# Patient Record
Sex: MALE | Race: WHITE | NOT HISPANIC OR LATINO | URBAN - METROPOLITAN AREA
[De-identification: names, ages, dates, MRNs, and addresses within clinical notes are randomized per-mention and may not be internally consistent; named-entity substitution may affect disease eponyms.]

---

## 2023-03-20 DIAGNOSIS — E78.5 HYPERLIPIDEMIA, UNSPECIFIED HYPERLIPIDEMIA TYPE: Primary | ICD-10-CM

## 2023-03-20 RX ORDER — ATORVASTATIN CALCIUM 10 MG/1
10 TABLET, FILM COATED ORAL DAILY
COMMUNITY
Start: 2022-12-20 | End: 2023-03-20 | Stop reason: SDUPTHER

## 2023-03-21 RX ORDER — ATORVASTATIN CALCIUM 10 MG/1
10 TABLET, FILM COATED ORAL DAILY
Qty: 90 TABLET | Refills: 0 | Status: SHIPPED | OUTPATIENT
Start: 2023-03-21 | End: 2023-06-16 | Stop reason: SDUPTHER

## 2023-04-05 ENCOUNTER — OFFICE VISIT (OUTPATIENT)
Dept: PRIMARY CARE | Facility: CLINIC | Age: 68
End: 2023-04-05
Payer: MEDICARE

## 2023-04-05 ENCOUNTER — LAB (OUTPATIENT)
Dept: LAB | Facility: LAB | Age: 68
End: 2023-04-05
Payer: MEDICARE

## 2023-04-05 VITALS
SYSTOLIC BLOOD PRESSURE: 136 MMHG | HEIGHT: 74 IN | BODY MASS INDEX: 36.96 KG/M2 | DIASTOLIC BLOOD PRESSURE: 80 MMHG | WEIGHT: 288 LBS

## 2023-04-05 DIAGNOSIS — E11.9 TYPE 2 DIABETES MELLITUS WITHOUT COMPLICATION, WITHOUT LONG-TERM CURRENT USE OF INSULIN (MULTI): ICD-10-CM

## 2023-04-05 DIAGNOSIS — I49.01 VENTRICULAR FIBRILLATION (MULTI): ICD-10-CM

## 2023-04-05 DIAGNOSIS — Z00.00 HEALTH CARE MAINTENANCE: ICD-10-CM

## 2023-04-05 DIAGNOSIS — I48.11 LONGSTANDING PERSISTENT ATRIAL FIBRILLATION (MULTI): ICD-10-CM

## 2023-04-05 DIAGNOSIS — E03.8 OTHER SPECIFIED HYPOTHYROIDISM: ICD-10-CM

## 2023-04-05 DIAGNOSIS — I82.4Y9 DEEP VEIN THROMBOSIS (DVT) OF PROXIMAL LOWER EXTREMITY, UNSPECIFIED CHRONICITY, UNSPECIFIED LATERALITY (MULTI): ICD-10-CM

## 2023-04-05 DIAGNOSIS — Z00.00 ROUTINE GENERAL MEDICAL EXAMINATION AT HEALTH CARE FACILITY: Primary | ICD-10-CM

## 2023-04-05 DIAGNOSIS — G62.9 NEUROPATHY: ICD-10-CM

## 2023-04-05 DIAGNOSIS — D69.6 THROMBOCYTOPENIA (CMS-HCC): ICD-10-CM

## 2023-04-05 PROBLEM — E78.2 MIXED HYPERLIPIDEMIA: Status: ACTIVE | Noted: 2023-04-05

## 2023-04-05 LAB
ESTIMATED AVERAGE GLUCOSE FOR HBA1C: 126 MG/DL
HEMOGLOBIN A1C/HEMOGLOBIN TOTAL IN BLOOD: 6 %
THYROTROPIN (MIU/L) IN SER/PLAS BY DETECTION LIMIT <= 0.05 MIU/L: 2.58 MIU/L (ref 0.44–3.98)

## 2023-04-05 PROCEDURE — 1160F RVW MEDS BY RX/DR IN RCRD: CPT | Performed by: STUDENT IN AN ORGANIZED HEALTH CARE EDUCATION/TRAINING PROGRAM

## 2023-04-05 PROCEDURE — 1170F FXNL STATUS ASSESSED: CPT | Performed by: STUDENT IN AN ORGANIZED HEALTH CARE EDUCATION/TRAINING PROGRAM

## 2023-04-05 PROCEDURE — 83036 HEMOGLOBIN GLYCOSYLATED A1C: CPT

## 2023-04-05 PROCEDURE — 1036F TOBACCO NON-USER: CPT | Performed by: STUDENT IN AN ORGANIZED HEALTH CARE EDUCATION/TRAINING PROGRAM

## 2023-04-05 PROCEDURE — G0439 PPPS, SUBSEQ VISIT: HCPCS | Performed by: STUDENT IN AN ORGANIZED HEALTH CARE EDUCATION/TRAINING PROGRAM

## 2023-04-05 PROCEDURE — 36415 COLL VENOUS BLD VENIPUNCTURE: CPT

## 2023-04-05 PROCEDURE — 1159F MED LIST DOCD IN RCRD: CPT | Performed by: STUDENT IN AN ORGANIZED HEALTH CARE EDUCATION/TRAINING PROGRAM

## 2023-04-05 PROCEDURE — 3075F SYST BP GE 130 - 139MM HG: CPT | Performed by: STUDENT IN AN ORGANIZED HEALTH CARE EDUCATION/TRAINING PROGRAM

## 2023-04-05 PROCEDURE — 99213 OFFICE O/P EST LOW 20 MIN: CPT | Performed by: STUDENT IN AN ORGANIZED HEALTH CARE EDUCATION/TRAINING PROGRAM

## 2023-04-05 PROCEDURE — 84443 ASSAY THYROID STIM HORMONE: CPT

## 2023-04-05 PROCEDURE — 3079F DIAST BP 80-89 MM HG: CPT | Performed by: STUDENT IN AN ORGANIZED HEALTH CARE EDUCATION/TRAINING PROGRAM

## 2023-04-05 RX ORDER — BUDESONIDE AND FORMOTEROL FUMARATE DIHYDRATE 80; 4.5 UG/1; UG/1
2 AEROSOL RESPIRATORY (INHALATION) 2 TIMES DAILY
COMMUNITY
End: 2024-04-16 | Stop reason: SDUPTHER

## 2023-04-05 RX ORDER — TAMSULOSIN HYDROCHLORIDE 0.4 MG/1
0.4 CAPSULE ORAL NIGHTLY
COMMUNITY
End: 2023-09-21 | Stop reason: SDUPTHER

## 2023-04-05 RX ORDER — GABAPENTIN 300 MG/1
300 CAPSULE ORAL 3 TIMES DAILY
Qty: 90 CAPSULE | Refills: 3 | Status: SHIPPED | OUTPATIENT
Start: 2023-04-05 | End: 2023-06-19 | Stop reason: SDUPTHER

## 2023-04-05 RX ORDER — SEMAGLUTIDE 0.68 MG/ML
INJECTION, SOLUTION SUBCUTANEOUS
COMMUNITY
Start: 2023-03-27 | End: 2023-04-05 | Stop reason: ALTCHOICE

## 2023-04-05 RX ORDER — ASCORBIC ACID 1000 MG
120 TABLET ORAL DAILY
COMMUNITY

## 2023-04-05 RX ORDER — ALBUTEROL SULFATE 90 UG/1
1 AEROSOL, METERED RESPIRATORY (INHALATION) EVERY 6 HOURS PRN
COMMUNITY
Start: 2008-12-25

## 2023-04-05 RX ORDER — CETIRIZINE HYDROCHLORIDE 10 MG/1
10 TABLET ORAL DAILY
COMMUNITY
End: 2023-11-15 | Stop reason: ALTCHOICE

## 2023-04-05 RX ORDER — BLOOD-GLUCOSE METER
KIT MISCELLANEOUS
COMMUNITY
Start: 2022-06-10 | End: 2024-01-31 | Stop reason: ALTCHOICE

## 2023-04-05 RX ORDER — RIVAROXABAN 20 MG/1
20 TABLET, FILM COATED ORAL
COMMUNITY
Start: 2021-12-28 | End: 2024-03-07 | Stop reason: SDUPTHER

## 2023-04-05 RX ORDER — LEVOTHYROXINE SODIUM 50 UG/1
50 TABLET ORAL DAILY
COMMUNITY
End: 2023-07-17 | Stop reason: SDUPTHER

## 2023-04-05 RX ORDER — BETAMETHASONE DIPROPIONATE 0.5 MG/G
CREAM TOPICAL
COMMUNITY
Start: 2023-01-05 | End: 2023-07-07 | Stop reason: ALTCHOICE

## 2023-04-05 RX ORDER — GABAPENTIN 300 MG/1
300 CAPSULE ORAL 3 TIMES DAILY
COMMUNITY
End: 2023-04-05 | Stop reason: SDUPTHER

## 2023-04-05 RX ORDER — METOPROLOL SUCCINATE 100 MG/1
100 TABLET, EXTENDED RELEASE ORAL 2 TIMES DAILY
COMMUNITY

## 2023-04-05 ASSESSMENT — ENCOUNTER SYMPTOMS
DEPRESSION: 0
LOSS OF SENSATION IN FEET: 0
OCCASIONAL FEELINGS OF UNSTEADINESS: 0

## 2023-04-05 ASSESSMENT — ACTIVITIES OF DAILY LIVING (ADL)
MANAGING_FINANCES: INDEPENDENT
BATHING: INDEPENDENT
DOING_HOUSEWORK: INDEPENDENT
GROCERY_SHOPPING: INDEPENDENT
TAKING_MEDICATION: INDEPENDENT
DRESSING: INDEPENDENT

## 2023-04-05 NOTE — PROGRESS NOTES
"Subjective   Patient ID: Aleks Boucher is a 67 y.o. male who presents for Medicare Annual Wellness Visit Subsequent (Discuss med).    HPI   Routine fu and Wellness exam  He says Ozempic is expensive, wants to try decreasing dose of Ozempic.  Blood sugars are well-controlled at home.  Walking regularly for exercise.  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  Review of Systems  12-point ROS reviewed and was negative unless otherwise noted in HPI.    Objective   /80   Ht 1.88 m (6' 2\")   Wt 131 kg (288 lb)   BMI 36.98 kg/m²     Physical Exam  GEN: conversant, NAD  HEENT: PERRL, EOMI, wearing a mask  NECK: supple, no carotid bruits appreciated b/l  CV: S1, S2, RRR  PULM: CTAB  ABD: soft, NT, obese  NEURO: no new gross focal deficits  EXT: +BLE edema  PSYCH: appropriate affect    Assessment/Plan     #H/o VT arrest s/p ICD  Follows with EP and getting ICD replacement soon.     #Permanent A. fib  Well-controlled  Following with cardiology    #Hyperlipidemia  Continue atorvastatin 10mg QD    #Diabetes, type 2  Try decreasing Ozempic dose to 0.25mg at his request, discussed healthy lifestyle habits.   A1c at appropriate time   Home sugars in the 100s.     #Hypothyroidism  Continue levothyroxine, check TSH    #BPH - continue tamsulosin  #Nosebleed, resolved  Using nasal saline regularly.       #Health maintenance: he says that he had a colonoscopy in 2022, no polyps. Advised Shingrix and yearly flu shot. Received COVID-19 vaccines. Received Pneumovax 23. He declines TDaP.    RTC 4 months      "

## 2023-04-06 ENCOUNTER — HOSPITAL ENCOUNTER (OUTPATIENT)
Dept: DATA CONVERSION | Facility: HOSPITAL | Age: 68
End: 2023-04-06
Attending: INTERNAL MEDICINE | Admitting: INTERNAL MEDICINE
Payer: MEDICARE

## 2023-04-06 DIAGNOSIS — I48.0 PAROXYSMAL ATRIAL FIBRILLATION (MULTI): ICD-10-CM

## 2023-04-06 DIAGNOSIS — I49.01 VENTRICULAR FIBRILLATION (MULTI): ICD-10-CM

## 2023-04-06 DIAGNOSIS — I34.0 NONRHEUMATIC MITRAL (VALVE) INSUFFICIENCY: ICD-10-CM

## 2023-04-06 DIAGNOSIS — Z79.01 LONG TERM (CURRENT) USE OF ANTICOAGULANTS: ICD-10-CM

## 2023-04-06 DIAGNOSIS — Z88.0 ALLERGY STATUS TO PENICILLIN: ICD-10-CM

## 2023-04-06 DIAGNOSIS — I47.20 VENTRICULAR TACHYCARDIA, UNSPECIFIED (MULTI): ICD-10-CM

## 2023-04-06 DIAGNOSIS — Z45.02 ENCOUNTER FOR ADJUSTMENT AND MANAGEMENT OF AUTOMATIC IMPLANTABLE CARDIAC DEFIBRILLATOR: ICD-10-CM

## 2023-04-06 DIAGNOSIS — Z79.85 LONG-TERM (CURRENT) USE OF INJECTABLE NON-INSULIN ANTIDIABETIC DRUGS: ICD-10-CM

## 2023-04-06 DIAGNOSIS — E11.9 TYPE 2 DIABETES MELLITUS WITHOUT COMPLICATIONS (MULTI): ICD-10-CM

## 2023-04-06 DIAGNOSIS — Z86.718 PERSONAL HISTORY OF OTHER VENOUS THROMBOSIS AND EMBOLISM: ICD-10-CM

## 2023-04-06 LAB — POCT GLUCOSE: 99 MG/DL (ref 74–99)

## 2023-04-07 LAB
ATRIAL RATE: 87 BPM
Q ONSET: 222 MS
QRS COUNT: 12 BEATS
QRS DURATION: 94 MS
QT INTERVAL: 426 MS
QTC CALCULATION(BAZETT): 472 MS
QTC FREDERICIA: 457 MS
R AXIS: 24 DEGREES
T AXIS: 17 DEGREES
T OFFSET: 435 MS
VENTRICULAR RATE: 74 BPM

## 2023-05-25 ENCOUNTER — OFFICE VISIT (OUTPATIENT)
Dept: PRIMARY CARE | Facility: CLINIC | Age: 68
End: 2023-05-25
Payer: MEDICARE

## 2023-05-25 VITALS
HEIGHT: 74 IN | WEIGHT: 295 LBS | DIASTOLIC BLOOD PRESSURE: 82 MMHG | BODY MASS INDEX: 37.86 KG/M2 | SYSTOLIC BLOOD PRESSURE: 124 MMHG

## 2023-05-25 DIAGNOSIS — E11.9 TYPE 2 DIABETES MELLITUS WITHOUT COMPLICATION, WITHOUT LONG-TERM CURRENT USE OF INSULIN (MULTI): Primary | ICD-10-CM

## 2023-05-25 DIAGNOSIS — E78.2 MIXED HYPERLIPIDEMIA: ICD-10-CM

## 2023-05-25 DIAGNOSIS — I48.11 LONGSTANDING PERSISTENT ATRIAL FIBRILLATION (MULTI): ICD-10-CM

## 2023-05-25 DIAGNOSIS — E03.9 HYPOTHYROIDISM, UNSPECIFIED TYPE: ICD-10-CM

## 2023-05-25 PROCEDURE — 99214 OFFICE O/P EST MOD 30 MIN: CPT | Performed by: STUDENT IN AN ORGANIZED HEALTH CARE EDUCATION/TRAINING PROGRAM

## 2023-05-25 PROCEDURE — 1160F RVW MEDS BY RX/DR IN RCRD: CPT | Performed by: STUDENT IN AN ORGANIZED HEALTH CARE EDUCATION/TRAINING PROGRAM

## 2023-05-25 PROCEDURE — 3079F DIAST BP 80-89 MM HG: CPT | Performed by: STUDENT IN AN ORGANIZED HEALTH CARE EDUCATION/TRAINING PROGRAM

## 2023-05-25 PROCEDURE — 1036F TOBACCO NON-USER: CPT | Performed by: STUDENT IN AN ORGANIZED HEALTH CARE EDUCATION/TRAINING PROGRAM

## 2023-05-25 PROCEDURE — 3074F SYST BP LT 130 MM HG: CPT | Performed by: STUDENT IN AN ORGANIZED HEALTH CARE EDUCATION/TRAINING PROGRAM

## 2023-05-25 PROCEDURE — 3044F HG A1C LEVEL LT 7.0%: CPT | Performed by: STUDENT IN AN ORGANIZED HEALTH CARE EDUCATION/TRAINING PROGRAM

## 2023-05-25 PROCEDURE — 1159F MED LIST DOCD IN RCRD: CPT | Performed by: STUDENT IN AN ORGANIZED HEALTH CARE EDUCATION/TRAINING PROGRAM

## 2023-05-25 NOTE — PROGRESS NOTES
"Subjective   Patient ID: Aleks Boucher is a 68 y.o. male who presents for Follow-up (Discuss meds).    HPI   Routine fu.   His Ozempic dose was decreased after our last visit at his request, but blood sugars have been a bit higher, so he is asking to increase Ozempic again back to 0.5mg.    He is asking for patient assistance application to be filled out for [employer] to have Ozempic be covered.  Review of Systems  12-point ROS reviewed and was negative unless otherwise noted in HPI.    Objective   /82   Ht 1.88 m (6' 2\")   Wt 134 kg (295 lb)   BMI 37.88 kg/m²     Physical Exam  GEN: conversant, NAD  HEENT: PERRL, EOMI, MMM  NECK: supple, no carotid bruits appreciated b/l  CV: S1, S2, RRR  PULM: CTAB  ABD: soft, NT, obese  NEURO: no new gross focal deficits  EXT: no sig LE edema  PSYCH: appropriate affect    Assessment/Plan     #H/o VT arrest s/p ICD  Follows with EP and getting ICD replacement soon.      #Permanent A. fib  Well-controlled  Following with cardiology     #Hyperlipidemia  Continue atorvastatin 10mg QD     #Diabetes, type 2  Increase Ozempic dose to 0.5mg, discussed healthy lifestyle habits.   A1c at appropriate time   Completed paperwork for pharmaceutical company coverage     #Hypothyroidism  Continue levothyroxine     #BPH - continue tamsulosin  #Nosebleed, resolved  Using nasal saline regularly.      #Health maintenance: he says that he had a colonoscopy in 2022, no polyps. Advised Shingrix and yearly flu shot. Received COVID-19 vaccines. Received Pneumovax 23. He declines TDaP.     RTC 4 months      "

## 2023-06-16 DIAGNOSIS — E78.5 HYPERLIPIDEMIA, UNSPECIFIED HYPERLIPIDEMIA TYPE: ICD-10-CM

## 2023-06-16 RX ORDER — ATORVASTATIN CALCIUM 10 MG/1
10 TABLET, FILM COATED ORAL DAILY
Qty: 90 TABLET | Refills: 0 | Status: SHIPPED | OUTPATIENT
Start: 2023-06-16 | End: 2023-09-21 | Stop reason: SDUPTHER

## 2023-06-19 DIAGNOSIS — G62.9 NEUROPATHY: ICD-10-CM

## 2023-06-19 RX ORDER — GABAPENTIN 300 MG/1
300 CAPSULE ORAL 3 TIMES DAILY
Qty: 270 CAPSULE | Refills: 0 | Status: SHIPPED | OUTPATIENT
Start: 2023-06-19 | End: 2023-09-21 | Stop reason: SDUPTHER

## 2023-07-07 ENCOUNTER — OFFICE VISIT (OUTPATIENT)
Dept: PRIMARY CARE | Facility: CLINIC | Age: 68
End: 2023-07-07
Payer: MEDICARE

## 2023-07-07 VITALS
HEIGHT: 74 IN | BODY MASS INDEX: 37.99 KG/M2 | SYSTOLIC BLOOD PRESSURE: 126 MMHG | WEIGHT: 296 LBS | DIASTOLIC BLOOD PRESSURE: 78 MMHG

## 2023-07-07 DIAGNOSIS — J32.9 BACTERIAL SINUSITIS: Primary | ICD-10-CM

## 2023-07-07 DIAGNOSIS — H10.9 BACTERIAL CONJUNCTIVITIS: ICD-10-CM

## 2023-07-07 DIAGNOSIS — B96.89 BACTERIAL SINUSITIS: Primary | ICD-10-CM

## 2023-07-07 PROCEDURE — 3078F DIAST BP <80 MM HG: CPT | Performed by: INTERNAL MEDICINE

## 2023-07-07 PROCEDURE — 99214 OFFICE O/P EST MOD 30 MIN: CPT | Performed by: INTERNAL MEDICINE

## 2023-07-07 PROCEDURE — 3044F HG A1C LEVEL LT 7.0%: CPT | Performed by: INTERNAL MEDICINE

## 2023-07-07 PROCEDURE — 1159F MED LIST DOCD IN RCRD: CPT | Performed by: INTERNAL MEDICINE

## 2023-07-07 PROCEDURE — 3074F SYST BP LT 130 MM HG: CPT | Performed by: INTERNAL MEDICINE

## 2023-07-07 PROCEDURE — 1036F TOBACCO NON-USER: CPT | Performed by: INTERNAL MEDICINE

## 2023-07-07 PROCEDURE — 1160F RVW MEDS BY RX/DR IN RCRD: CPT | Performed by: INTERNAL MEDICINE

## 2023-07-07 PROCEDURE — 1126F AMNT PAIN NOTED NONE PRSNT: CPT | Performed by: INTERNAL MEDICINE

## 2023-07-07 RX ORDER — AZITHROMYCIN 250 MG/1
TABLET, FILM COATED ORAL
Qty: 6 TABLET | Refills: 3 | Status: SHIPPED | OUTPATIENT
Start: 2023-07-07 | End: 2023-07-12

## 2023-07-07 RX ORDER — CIPROFLOXACIN HYDROCHLORIDE 3 MG/ML
1 SOLUTION/ DROPS OPHTHALMIC
Qty: 6 ML | Refills: 3 | Status: SHIPPED | OUTPATIENT
Start: 2023-07-07 | End: 2023-07-17

## 2023-07-07 NOTE — PROGRESS NOTES
"Subjective   Patient ID: Aleks Boucher is a 68 y.o. male who presents for Sinus Problem (Congestion,x 1 week).  HPI  Patient presents with sinus congestion 1 week grade 6 nothing makes better or worse  Green rhinorrhea  Postnasal drip  Occasional cough AM prod  Feels like his eyes are affected some crusting where they feel shot in the morning  Denies chest pain dyspnea nausea vomiting fever chills otalgia otorrhea dyne aphasia              Health Maintenance:      Colonoscopy:      Mammogram:      Pelvic/Pap:      Low dose chest CT:      Aorta duplex:      Optho:      Podiatry:        Vaccines:      Prevnar 20:      Prevnar 13:      Pneumovax 23:      Tdap:      Shingrix:      COVID:      Influenza:        ROS:      General: denies fever/chills/weight loss      Head: Sinus congestion denies HA/trauma/masses/dizziness      Eyes: denies vision change/loss of vision/blurry vision/diplopia/eye pain      Ears: denies hearing loss/tinnitus/otalgia/otorrhea      Nose: Rhinorrhea denies nasal drainage/anosmia      Throat: denies dysphagia/odynophagia      Lymphatics: denies lymph node swelling      Cardiac: denies CP/palpitations/orthopnea/PND      Pulmonary: Cough denies dyspnea/cough/wheezing      GI: denies abd pain/n/v/diarrhea/melena/hematochezia/hematemesis      : denies dysuria/hematuria/change frequency      Genital: denies genital discharge/lesions      Skin: denies rashes/lesions/masses      MSK: denies weakness/swelling/edema/gait imbalance/pain      Neuro: denies paresthesias/seizures/dysarthria      Psych: denies depression/anxiety/suicidal or homicidal ideations            Objective   /78   Ht 1.88 m (6' 2\")   Wt 134 kg (296 lb)   BMI 38.00 kg/m²      Physical Exam:     General: AO3, NAD     Head: atraumatic/NC     Eyes: EOMI/PERRLA. Negative APD     Ears: TM pearly gray, EAC clear. No lesions or erythema     Nose: symmetric nares, no discharge     Throat: Moderate posterior oropharyngeal erythema " "trachea midline, uvula midline pink mucosa. No thyromegaly     Lymphatics: no cervical/supraclavicular/ant or posterior cervical adenopathy/axillary/inguinal adenopathy     Breast: not examined     Chest: no deformity or tenderness to palpation     Pulm: CTA b/l, no wheeze/rhonchi/rales. nonlabored     Cardiac: RRR +s1s2, no m/r/g.      GI: soft, NT/ND. Normoactive Bsx4. No rebound/guarding.     Rectal: no examined     MSK: 5/5 strength UE LE. No edema/clubbing/cyanosis     Skin: no rashes/lesions     Vascular: 2+ palp DP PT radials b/l. Negative carotid bruit     Neuro: CNII-XII intact. No focal deficits. Reflexes 2/4 brachioradialis bicep tricep patellar achilles. Finger to nose intact.     Psych: appropriate mood/affect                    No results found for: \"BMPR1A\", \"CBCDIF\"      Assessment/Plan   Diagnoses and all orders for this visit:  Bacterial sinusitis  Comments:  Less likely superimposed COVID versus other  Orders:  -     azithromycin (Zithromax) 250 mg tablet; Take 2 tablets (500 mg) by mouth once daily for 1 day, THEN 1 tablet (250 mg) once daily for 4 days. Take 2 tabs (500 mg) by mouth today, than 1 daily for 4 days..  Bacterial conjunctivitis  -     ciprofloxacin (Ciloxan) 0.3 % ophthalmic solution; Administer 1 drop into both eyes every 2 hours for 10 days.    Take a home COVID test as you mentioned    Screening blood work due December 2023    Thank you for making appointment today Aleks    Please follow-up as planned with Dr. Sarkar on August 2, 2023       Aditya Hand,   "

## 2023-07-17 DIAGNOSIS — E03.9 HYPOTHYROIDISM, UNSPECIFIED TYPE: Primary | ICD-10-CM

## 2023-07-17 RX ORDER — LEVOTHYROXINE SODIUM 50 UG/1
50 TABLET ORAL DAILY
Qty: 90 TABLET | Refills: 0 | Status: SHIPPED | OUTPATIENT
Start: 2023-07-17 | End: 2023-09-21 | Stop reason: SDUPTHER

## 2023-08-02 ENCOUNTER — APPOINTMENT (OUTPATIENT)
Dept: PRIMARY CARE | Facility: CLINIC | Age: 68
End: 2023-08-02
Payer: MEDICARE

## 2023-09-14 NOTE — H&P
History & Physical Reviewed:   I have reviewed the History and Physical dated:  05-Apr-2023   History and Physical reviewed and relevant findings noted. Patient examined to review pertinent physical  findings.: No significant changes   Home Medications Reviewed: no changes noted   Allergies Reviewed: no changes noted       Airway/Sedation Assessment:  ·  Emotional Status calm   ·  Neurologic alert & oriented x 3   ·  Respiratory clear to auscultation   ·  Cardiovascular Afib, HR 69.   ·  GI/ soft, nontender     · Pulses present: Pedal Left, Pedal Right, Radial Left, Radial Right     ·  Mouth Opening OK yes   ·  Neck Flexibility OK yes   ·  Loose Teeth no     Oropharyngeal Classification:          ·  Oropharyngeal Classification Class III   ·  ASA PS Classification ASA III   ·  Sedation Plan moderate sedation       ERAS (Enhanced Recovery After Surgery):  ·  ERAS Patient: no     Consent:   COVID-19 Consent:  ·  COVID-19 Risk Consent Surgeon has reviewed key risks related to the risk of loretta COVID-19 and if they contract COVID-19 what the risks are.       Electronic Signatures:  Flower Herron (APRN-CNP)   (Signed 06-Apr-2023 10:54)   Entered: History & Physical Reviewed, Note Completion, Airway/Sedation, ERAS, Consent   Authored: History & Physical Reviewed, Airway/Sedation, ERAS, Consent, Note Completion  Ramicone, James ()   (Signed 06-Apr-2023 12:19)   Co-Signer: History & Physical Reviewed, Note Completion, Airway/Sedation, ERAS, Consent    Last Updated: 06-Apr-2023 12:19 by Ramicone, James ()

## 2023-09-21 ENCOUNTER — LAB (OUTPATIENT)
Dept: LAB | Facility: LAB | Age: 68
End: 2023-09-21
Payer: MEDICARE

## 2023-09-21 ENCOUNTER — OFFICE VISIT (OUTPATIENT)
Dept: PRIMARY CARE | Facility: CLINIC | Age: 68
End: 2023-09-21
Payer: MEDICARE

## 2023-09-21 VITALS
BODY MASS INDEX: 35.94 KG/M2 | HEIGHT: 74 IN | DIASTOLIC BLOOD PRESSURE: 84 MMHG | WEIGHT: 280 LBS | SYSTOLIC BLOOD PRESSURE: 128 MMHG

## 2023-09-21 DIAGNOSIS — Z00.00 HEALTH CARE MAINTENANCE: ICD-10-CM

## 2023-09-21 DIAGNOSIS — E78.5 HYPERLIPIDEMIA, UNSPECIFIED HYPERLIPIDEMIA TYPE: ICD-10-CM

## 2023-09-21 DIAGNOSIS — E11.69 TYPE 2 DIABETES MELLITUS WITH OTHER SPECIFIED COMPLICATION, WITHOUT LONG-TERM CURRENT USE OF INSULIN (MULTI): ICD-10-CM

## 2023-09-21 DIAGNOSIS — E11.9 TYPE 2 DIABETES MELLITUS WITHOUT COMPLICATION, WITHOUT LONG-TERM CURRENT USE OF INSULIN (MULTI): Primary | ICD-10-CM

## 2023-09-21 DIAGNOSIS — I49.01 VENTRICULAR FIBRILLATION (MULTI): ICD-10-CM

## 2023-09-21 DIAGNOSIS — G62.9 NEUROPATHY: ICD-10-CM

## 2023-09-21 DIAGNOSIS — N40.0 BENIGN PROSTATIC HYPERPLASIA, UNSPECIFIED WHETHER LOWER URINARY TRACT SYMPTOMS PRESENT: ICD-10-CM

## 2023-09-21 DIAGNOSIS — G89.29 CHRONIC PAIN OF BOTH SHOULDERS: ICD-10-CM

## 2023-09-21 DIAGNOSIS — E03.9 HYPOTHYROIDISM, UNSPECIFIED TYPE: ICD-10-CM

## 2023-09-21 DIAGNOSIS — Z00.00 HEALTHCARE MAINTENANCE: ICD-10-CM

## 2023-09-21 DIAGNOSIS — M25.512 CHRONIC PAIN OF BOTH SHOULDERS: ICD-10-CM

## 2023-09-21 DIAGNOSIS — Z12.5 SCREENING FOR PROSTATE CANCER: ICD-10-CM

## 2023-09-21 DIAGNOSIS — M25.511 CHRONIC PAIN OF BOTH SHOULDERS: ICD-10-CM

## 2023-09-21 LAB
ALANINE AMINOTRANSFERASE (SGPT) (U/L) IN SER/PLAS: 42 U/L (ref 10–52)
ALBUMIN (G/DL) IN SER/PLAS: 4.3 G/DL (ref 3.4–5)
ALKALINE PHOSPHATASE (U/L) IN SER/PLAS: 58 U/L (ref 33–136)
ANION GAP IN SER/PLAS: 14 MMOL/L (ref 10–20)
ASPARTATE AMINOTRANSFERASE (SGOT) (U/L) IN SER/PLAS: 29 U/L (ref 9–39)
BILIRUBIN TOTAL (MG/DL) IN SER/PLAS: 0.8 MG/DL (ref 0–1.2)
CALCIUM (MG/DL) IN SER/PLAS: 10 MG/DL (ref 8.6–10.6)
CARBON DIOXIDE, TOTAL (MMOL/L) IN SER/PLAS: 24 MMOL/L (ref 21–32)
CHLORIDE (MMOL/L) IN SER/PLAS: 105 MMOL/L (ref 98–107)
CHOLESTEROL (MG/DL) IN SER/PLAS: 148 MG/DL (ref 0–199)
CHOLESTEROL IN HDL (MG/DL) IN SER/PLAS: 46.7 MG/DL
CHOLESTEROL/HDL RATIO: 3.2
CREATININE (MG/DL) IN SER/PLAS: 1.09 MG/DL (ref 0.5–1.3)
ERYTHROCYTE DISTRIBUTION WIDTH (RATIO) BY AUTOMATED COUNT: 13.7 % (ref 11.5–14.5)
ERYTHROCYTE MEAN CORPUSCULAR HEMOGLOBIN CONCENTRATION (G/DL) BY AUTOMATED: 34.4 G/DL (ref 32–36)
ERYTHROCYTE MEAN CORPUSCULAR VOLUME (FL) BY AUTOMATED COUNT: 97 FL (ref 80–100)
ERYTHROCYTES (10*6/UL) IN BLOOD BY AUTOMATED COUNT: 4.64 X10E12/L (ref 4.5–5.9)
ESTIMATED AVERAGE GLUCOSE FOR HBA1C: 123 MG/DL
GFR MALE: 74 ML/MIN/1.73M2
GLUCOSE (MG/DL) IN SER/PLAS: 109 MG/DL (ref 74–99)
HEMATOCRIT (%) IN BLOOD BY AUTOMATED COUNT: 44.8 % (ref 41–52)
HEMOGLOBIN (G/DL) IN BLOOD: 15.4 G/DL (ref 13.5–17.5)
HEMOGLOBIN A1C/HEMOGLOBIN TOTAL IN BLOOD: 5.9 %
LDL: 57 MG/DL (ref 0–99)
LEUKOCYTES (10*3/UL) IN BLOOD BY AUTOMATED COUNT: 6.8 X10E9/L (ref 4.4–11.3)
NON HDL CHOLESTEROL: 101 MG/DL
NRBC (PER 100 WBCS) BY AUTOMATED COUNT: 0 /100 WBC (ref 0–0)
PLATELETS (10*3/UL) IN BLOOD AUTOMATED COUNT: 152 X10E9/L (ref 150–450)
POTASSIUM (MMOL/L) IN SER/PLAS: 4.6 MMOL/L (ref 3.5–5.3)
PROSTATE SPECIFIC AG (NG/ML) IN SER/PLAS: 0.26 NG/ML (ref 0–4)
PROTEIN TOTAL: 6.9 G/DL (ref 6.4–8.2)
SODIUM (MMOL/L) IN SER/PLAS: 138 MMOL/L (ref 136–145)
THYROTROPIN (MIU/L) IN SER/PLAS BY DETECTION LIMIT <= 0.05 MIU/L: 2.47 MIU/L (ref 0.44–3.98)
TRIGLYCERIDE (MG/DL) IN SER/PLAS: 222 MG/DL (ref 0–149)
UREA NITROGEN (MG/DL) IN SER/PLAS: 17 MG/DL (ref 6–23)
VLDL: 44 MG/DL (ref 0–40)

## 2023-09-21 PROCEDURE — 90686 IIV4 VACC NO PRSV 0.5 ML IM: CPT | Performed by: STUDENT IN AN ORGANIZED HEALTH CARE EDUCATION/TRAINING PROGRAM

## 2023-09-21 PROCEDURE — 1160F RVW MEDS BY RX/DR IN RCRD: CPT | Performed by: STUDENT IN AN ORGANIZED HEALTH CARE EDUCATION/TRAINING PROGRAM

## 2023-09-21 PROCEDURE — 80061 LIPID PANEL: CPT

## 2023-09-21 PROCEDURE — 83036 HEMOGLOBIN GLYCOSYLATED A1C: CPT

## 2023-09-21 PROCEDURE — G0008 ADMIN INFLUENZA VIRUS VAC: HCPCS | Performed by: STUDENT IN AN ORGANIZED HEALTH CARE EDUCATION/TRAINING PROGRAM

## 2023-09-21 PROCEDURE — 3079F DIAST BP 80-89 MM HG: CPT | Performed by: STUDENT IN AN ORGANIZED HEALTH CARE EDUCATION/TRAINING PROGRAM

## 2023-09-21 PROCEDURE — 99214 OFFICE O/P EST MOD 30 MIN: CPT | Performed by: STUDENT IN AN ORGANIZED HEALTH CARE EDUCATION/TRAINING PROGRAM

## 2023-09-21 PROCEDURE — 1036F TOBACCO NON-USER: CPT | Performed by: STUDENT IN AN ORGANIZED HEALTH CARE EDUCATION/TRAINING PROGRAM

## 2023-09-21 PROCEDURE — 36415 COLL VENOUS BLD VENIPUNCTURE: CPT

## 2023-09-21 PROCEDURE — 85027 COMPLETE CBC AUTOMATED: CPT

## 2023-09-21 PROCEDURE — 1159F MED LIST DOCD IN RCRD: CPT | Performed by: STUDENT IN AN ORGANIZED HEALTH CARE EDUCATION/TRAINING PROGRAM

## 2023-09-21 PROCEDURE — 80053 COMPREHEN METABOLIC PANEL: CPT

## 2023-09-21 PROCEDURE — 3044F HG A1C LEVEL LT 7.0%: CPT | Performed by: STUDENT IN AN ORGANIZED HEALTH CARE EDUCATION/TRAINING PROGRAM

## 2023-09-21 PROCEDURE — 84443 ASSAY THYROID STIM HORMONE: CPT

## 2023-09-21 PROCEDURE — 82570 ASSAY OF URINE CREATININE: CPT

## 2023-09-21 PROCEDURE — G0103 PSA SCREENING: HCPCS

## 2023-09-21 PROCEDURE — 82043 UR ALBUMIN QUANTITATIVE: CPT

## 2023-09-21 PROCEDURE — 3074F SYST BP LT 130 MM HG: CPT | Performed by: STUDENT IN AN ORGANIZED HEALTH CARE EDUCATION/TRAINING PROGRAM

## 2023-09-21 PROCEDURE — 1126F AMNT PAIN NOTED NONE PRSNT: CPT | Performed by: STUDENT IN AN ORGANIZED HEALTH CARE EDUCATION/TRAINING PROGRAM

## 2023-09-21 RX ORDER — TAMSULOSIN HYDROCHLORIDE 0.4 MG/1
0.4 CAPSULE ORAL NIGHTLY
Qty: 90 CAPSULE | Refills: 1 | Status: SHIPPED | OUTPATIENT
Start: 2023-09-21 | End: 2024-05-02 | Stop reason: SDUPTHER

## 2023-09-21 RX ORDER — GABAPENTIN 300 MG/1
300 CAPSULE ORAL 3 TIMES DAILY
Qty: 270 CAPSULE | Refills: 1 | Status: SHIPPED | OUTPATIENT
Start: 2023-09-21 | End: 2024-03-21 | Stop reason: SDUPTHER

## 2023-09-21 RX ORDER — ATORVASTATIN CALCIUM 10 MG/1
10 TABLET, FILM COATED ORAL DAILY
Qty: 90 TABLET | Refills: 1 | Status: SHIPPED | OUTPATIENT
Start: 2023-09-21 | End: 2024-03-21 | Stop reason: SDUPTHER

## 2023-09-21 RX ORDER — LEVOTHYROXINE SODIUM 50 UG/1
50 TABLET ORAL DAILY
Qty: 90 TABLET | Refills: 1 | Status: SHIPPED | OUTPATIENT
Start: 2023-09-21 | End: 2024-03-21 | Stop reason: SDUPTHER

## 2023-09-21 NOTE — PROGRESS NOTES
"Subjective   Patient ID: Aleks Boucher is a 68 y.o. male who presents for Follow-up (Med refill).    HPI   Routine fu.  Med refill.  He generally feels well aside from chronic shoulder pain.  Review of Systems  12-point ROS reviewed and was negative unless otherwise noted in HPI.    Objective   Ht 1.88 m (6' 2\")   Wt 127 kg (280 lb)   BMI 35.95 kg/m²     Physical Exam  GEN: conversant, NAD  HEENT: PERRL, EOMI, MMM  NECK: supple, no carotid bruits appreciated b/l  CV: S1, S2, RRR  PULM: CTAB  ABD: soft, NT, obese  NEURO: no new gross focal deficits  EXT: +BLE edema  PSYCH: appropriate affect    Assessment/Plan     #H/o VT arrest s/p ICD  Follows with EP and getting ICD replacement soon.      #Permanent A. fib  Well-controlled  Following with cardiology     #Hyperlipidemia  Continue atorvastatin 10mg daily, check lipids     #Diabetes, type 2  Continue Ozempic dose to 0.5mg, discussed healthy lifestyle habits.   Check A1c.  Referral to endocrinology due to difficulty with patient getting Ozempic from patient assistance program  - The patient has been seen for a face-to-face evaluation. The patient is diabetic. I am recommending the patient for diabetic shoes/inserts based on qualifying conditions listed on cmn.     #Hypothyroidism  Continue levothyroxine, check TSH     #BPH - continue tamsulosin     #Health maintenance: he says that he had a colonoscopy in 2022, no polyps. Advised Shingrix and yearly flu shot. Received COVID-19 vaccines. Received Pneumovax 23. He declines TDaP.     RTC 4 months      "

## 2023-09-22 LAB
ALBUMIN (MG/L) IN URINE: <7 MG/L
ALBUMIN/CREATININE (UG/MG) IN URINE: NORMAL UG/MG CRT (ref 0–30)
CREATININE (MG/DL) IN URINE: 73.7 MG/DL (ref 20–370)

## 2023-10-10 ENCOUNTER — HOSPITAL ENCOUNTER (OUTPATIENT)
Dept: CARDIOLOGY | Facility: CLINIC | Age: 68
Discharge: HOME | End: 2023-10-10
Payer: MEDICARE

## 2023-10-10 DIAGNOSIS — I49.01 VENTRICULAR FIBRILLATION (MULTI): ICD-10-CM

## 2023-10-10 DIAGNOSIS — Z95.810 PRESENCE OF AUTOMATIC CARDIOVERTER/DEFIBRILLATOR (AICD): ICD-10-CM

## 2023-10-10 PROCEDURE — 93296 REM INTERROG EVL PM/IDS: CPT

## 2023-10-10 PROCEDURE — 93295 DEV INTERROG REMOTE 1/2/MLT: CPT | Performed by: INTERNAL MEDICINE

## 2023-10-16 DIAGNOSIS — I49.01 VENTRICULAR FIBRILLATION (MULTI): ICD-10-CM

## 2023-10-16 DIAGNOSIS — Z95.810 PRESENCE OF AUTOMATIC CARDIOVERTER/DEFIBRILLATOR (AICD): ICD-10-CM

## 2023-11-01 ENCOUNTER — HOSPITAL ENCOUNTER (OUTPATIENT)
Dept: RADIOLOGY | Facility: HOSPITAL | Age: 68
Discharge: HOME | End: 2023-11-01
Payer: MEDICARE

## 2023-11-01 DIAGNOSIS — M25.511 BILATERAL SHOULDER PAIN: ICD-10-CM

## 2023-11-01 DIAGNOSIS — M25.512 BILATERAL SHOULDER PAIN: ICD-10-CM

## 2023-11-01 PROBLEM — D18.01 HEMANGIOMA OF SKIN AND SUBCUTANEOUS TISSUE: Status: ACTIVE | Noted: 2023-06-21

## 2023-11-01 PROBLEM — L81.9 DISORDER OF PIGMENTATION, UNSPECIFIED: Status: ACTIVE | Noted: 2023-06-21

## 2023-11-01 PROBLEM — I95.9 HYPOTENSION: Status: ACTIVE | Noted: 2022-12-29

## 2023-11-01 PROBLEM — D48.5 NEOPLASM OF UNCERTAIN BEHAVIOR OF SKIN: Status: ACTIVE | Noted: 2023-06-21

## 2023-11-01 PROBLEM — L57.9 SKIN CHANGES DUE TO CHRONIC EXPOSURE TO NONIONIZING RADIATION, UNSPECIFIED: Status: ACTIVE | Noted: 2023-06-21

## 2023-11-01 PROBLEM — I10 HTN (HYPERTENSION): Status: ACTIVE | Noted: 2023-11-01

## 2023-11-01 PROBLEM — C44.712 BASAL CELL CARCINOMA OF SKIN OF RIGHT LOWER LIMB, INCLUDING HIP: Status: ACTIVE | Noted: 2023-06-21

## 2023-11-01 PROBLEM — Z95.810 IMPLANTABLE CARDIOVERTER-DEFIBRILLATOR (ICD) IN SITU: Status: ACTIVE | Noted: 2022-12-29

## 2023-11-01 PROBLEM — N40.0 BPH (BENIGN PROSTATIC HYPERPLASIA): Status: ACTIVE | Noted: 2023-11-01

## 2023-11-01 PROBLEM — L57.0 ACTINIC KERATOSIS: Status: ACTIVE | Noted: 2023-06-21

## 2023-11-01 PROBLEM — G62.9 PERIPHERAL NEUROPATHY: Status: ACTIVE | Noted: 2023-11-01

## 2023-11-01 PROBLEM — B35.9 TINEA: Status: ACTIVE | Noted: 2023-11-01

## 2023-11-01 PROBLEM — L98.9 SKIN LESION: Status: ACTIVE | Noted: 2023-11-01

## 2023-11-01 PROBLEM — L82.1 OTHER SEBORRHEIC KERATOSIS: Status: ACTIVE | Noted: 2023-06-21

## 2023-11-01 PROBLEM — Z86.718 HISTORY OF RECURRENT DEEP VEIN THROMBOSIS (DVT): Status: ACTIVE | Noted: 2023-11-01

## 2023-11-01 PROBLEM — D23.39 OTHER BENIGN NEOPLASM OF SKIN OF OTHER PARTS OF FACE: Status: ACTIVE | Noted: 2023-06-21

## 2023-11-01 PROBLEM — R00.1 BRADYCARDIA: Status: ACTIVE | Noted: 2022-12-29

## 2023-11-01 PROBLEM — R04.0 EPISTAXIS: Status: ACTIVE | Noted: 2022-12-29

## 2023-11-01 PROBLEM — N17.9 AKI (ACUTE KIDNEY INJURY) (CMS-HCC): Status: ACTIVE | Noted: 2022-12-29

## 2023-11-01 PROCEDURE — 73030 X-RAY EXAM OF SHOULDER: CPT | Mod: BILATERAL PROCEDURE | Performed by: RADIOLOGY

## 2023-11-01 PROCEDURE — 73030 X-RAY EXAM OF SHOULDER: CPT | Mod: 50

## 2023-11-01 RX ORDER — VITAMIN E 268 MG
CAPSULE ORAL DAILY
COMMUNITY
Start: 2005-09-29 | End: 2023-11-15 | Stop reason: ALTCHOICE

## 2023-11-01 RX ORDER — METOPROLOL TARTRATE 50 MG/1
50 TABLET ORAL 2 TIMES DAILY
COMMUNITY
Start: 2009-01-15 | End: 2023-11-15 | Stop reason: ALTCHOICE

## 2023-11-01 RX ORDER — FLUTICASONE PROPIONATE 110 UG/1
AEROSOL, METERED RESPIRATORY (INHALATION) AS NEEDED
COMMUNITY
Start: 2008-12-25 | End: 2023-11-15 | Stop reason: ALTCHOICE

## 2023-11-01 RX ORDER — WARFARIN SODIUM 5 MG/1
10 TABLET ORAL
COMMUNITY
Start: 2009-03-26 | End: 2023-11-15 | Stop reason: ALTCHOICE

## 2023-11-01 RX ORDER — ACETAMINOPHEN 160 MG/5ML
SUSPENSION, ORAL (FINAL DOSE FORM) ORAL
COMMUNITY

## 2023-11-01 RX ORDER — CLINDAMYCIN HYDROCHLORIDE 150 MG/1
300 CAPSULE ORAL
COMMUNITY

## 2023-11-01 RX ORDER — CLOTRIMAZOLE 1 %
CREAM (GRAM) TOPICAL 2 TIMES DAILY
COMMUNITY
Start: 2022-10-17 | End: 2023-11-15 | Stop reason: ALTCHOICE

## 2023-11-01 RX ORDER — BACLOFEN 20 MG
500 TABLET ORAL DAILY
COMMUNITY
End: 2023-11-15 | Stop reason: ALTCHOICE

## 2023-11-01 RX ORDER — AMIODARONE HYDROCHLORIDE 200 MG/1
200 TABLET ORAL DAILY
COMMUNITY
Start: 2008-12-25 | End: 2023-11-15 | Stop reason: ALTCHOICE

## 2023-11-02 ENCOUNTER — OFFICE VISIT (OUTPATIENT)
Dept: ORTHOPEDIC SURGERY | Facility: CLINIC | Age: 68
End: 2023-11-02
Payer: MEDICARE

## 2023-11-02 VITALS — BODY MASS INDEX: 37.22 KG/M2 | HEIGHT: 74 IN | WEIGHT: 290 LBS

## 2023-11-02 DIAGNOSIS — G89.29 CHRONIC PAIN OF BOTH SHOULDERS: ICD-10-CM

## 2023-11-02 DIAGNOSIS — M25.511 BILATERAL SHOULDER PAIN: ICD-10-CM

## 2023-11-02 DIAGNOSIS — M75.82 TENDINITIS OF BOTH ROTATOR CUFFS: Primary | ICD-10-CM

## 2023-11-02 DIAGNOSIS — M75.81 TENDINITIS OF BOTH ROTATOR CUFFS: Primary | ICD-10-CM

## 2023-11-02 DIAGNOSIS — M25.512 CHRONIC PAIN OF BOTH SHOULDERS: ICD-10-CM

## 2023-11-02 DIAGNOSIS — M25.511 CHRONIC PAIN OF BOTH SHOULDERS: ICD-10-CM

## 2023-11-02 DIAGNOSIS — M25.512 BILATERAL SHOULDER PAIN: ICD-10-CM

## 2023-11-02 PROCEDURE — 3044F HG A1C LEVEL LT 7.0%: CPT | Performed by: ORTHOPAEDIC SURGERY

## 2023-11-02 PROCEDURE — 1126F AMNT PAIN NOTED NONE PRSNT: CPT | Performed by: ORTHOPAEDIC SURGERY

## 2023-11-02 PROCEDURE — 1036F TOBACCO NON-USER: CPT | Performed by: ORTHOPAEDIC SURGERY

## 2023-11-02 PROCEDURE — 1160F RVW MEDS BY RX/DR IN RCRD: CPT | Performed by: ORTHOPAEDIC SURGERY

## 2023-11-02 PROCEDURE — 1159F MED LIST DOCD IN RCRD: CPT | Performed by: ORTHOPAEDIC SURGERY

## 2023-11-02 PROCEDURE — 3079F DIAST BP 80-89 MM HG: CPT | Performed by: ORTHOPAEDIC SURGERY

## 2023-11-02 PROCEDURE — 99203 OFFICE O/P NEW LOW 30 MIN: CPT | Performed by: ORTHOPAEDIC SURGERY

## 2023-11-02 PROCEDURE — 3074F SYST BP LT 130 MM HG: CPT | Performed by: ORTHOPAEDIC SURGERY

## 2023-11-02 NOTE — PROGRESS NOTES
68-year-old is seen with bilateral shoulder pain.  Left bothers him more than the right.  Is been having persistent moderate throbbing pain that is worse with overhead reaching and lifting activities.  A few years ago he injured the right shoulder shoveling snow.  About 3 months ago he fell and injured the left shoulder.  He has had a valve repair and has a pacemaker.  He is on Xarelto and is unable to take NSAIDs.  He has taken Tylenol with mild relief.  Past medical social family history review of systems reviewed and updated on the information sheet.    Pleasant and no acute distress.  Right shoulder forward flexion 160°. No effusion or instability. There is positive Neer and Porras impingement. There is subacromial crepitus and tenderness around the subacromial space. No biceps tenderness. No acromioclavicular tenderness. Rotator cuff strength is intact but there is discomfort with strength testing. Left shoulder forward flexion 160°. No effusion or instability. There is impingement an crepitus and tenderness involving the subacromial space. There is positive Neer and Porras impingement. No biceps tenderness. No acromioclavicular tenderness. Rotator cuff strength is intact but there is discomfort with strength testing. There is adequate range of motion of the cervical spine without pain. Both upper extremities are well perfused, skin is intact and muscle tone is adequate. Elbow flexion and extension and wrist flexion and extension strength are intact.    Multiple x-ray views of the right shoulder and multiple x-ray views of the left shoulder are personally reviewed and there is no acute bony abnormality.  There is acromioclavicular arthrosis in both shoulders.    Discussion about rotator cuff tendinitis was done.  Treatment options were reviewed.  He will perform physical therapy.  He can use Tylenol.  He will ice as needed.  Option of cortisone injection was discussed.  If his symptoms persist then he could  return for a cortisone injection.

## 2023-11-08 ASSESSMENT — PATIENT GLOBAL ASSESSMENT (PGA): WHAT IS THE PGA: PATIENT GLOBAL ASSESSMENT:  1 - CLEAR

## 2023-11-08 ASSESSMENT — DERMATOLOGY QUALITY OF LIFE (QOL) ASSESSMENT
RATE HOW BOTHERED YOU ARE BY SYMPTOMS OF YOUR SKIN PROBLEM (EG, ITCHING, STINGING BURNING, HURTING OR SKIN IRRITATION): 0 - NEVER BOTHERED
WHAT SINGLE SKIN CONDITION LISTED BELOW IS THE PATIENT ANSWERING THE QUALITY-OF-LIFE ASSESSMENT QUESTIONS ABOUT: NONE OF THE ABOVE
RATE HOW EMOTIONALLY BOTHERED YOU ARE BY YOUR SKIN PROBLEM (FOR EXAMPLE, WORRY, EMBARRASSMENT, FRUSTRATION): 0 - NEVER BOTHERED
RATE HOW EMOTIONALLY BOTHERED YOU ARE BY YOUR SKIN PROBLEM (FOR EXAMPLE, WORRY, EMBARRASSMENT, FRUSTRATION): 0 - NEVER BOTHERED
RATE HOW BOTHERED YOU ARE BY EFFECTS OF YOUR SKIN PROBLEMS ON YOUR ACTIVITIES (EG, GOING OUT, ACCOMPLISHING WHAT YOU WANT, WORK ACTIVITIES OR YOUR RELATIONSHIPS WITH OTHERS): 0 - NEVER BOTHERED
RATE HOW BOTHERED YOU ARE BY SYMPTOMS OF YOUR SKIN PROBLEM (EG, ITCHING, STINGING BURNING, HURTING OR SKIN IRRITATION): 0 - NEVER BOTHERED
WHAT SINGLE SKIN CONDITION LISTED BELOW IS THE PATIENT ANSWERING THE QUALITY-OF-LIFE ASSESSMENT QUESTIONS ABOUT: NONE OF THE ABOVE
RATE HOW BOTHERED YOU ARE BY EFFECTS OF YOUR SKIN PROBLEMS ON YOUR ACTIVITIES (EG, GOING OUT, ACCOMPLISHING WHAT YOU WANT, WORK ACTIVITIES OR YOUR RELATIONSHIPS WITH OTHERS): 0 - NEVER BOTHERED

## 2023-11-09 NOTE — PROGRESS NOTES
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  Patient has demonstrated following problem list and would benefit from one on one skilled physical therapy to decrease symptoms, and to return to prior to injury functional level.  Problem List:  -activity limitations  -Functional limitations  -Pain 4-9/10, 9/10 with wrong movement  -decreased  ROM  -decreased strength   -decreased flexibility  -posture awareness  -decreased knowledge of HEP  -balance     Goals:  STG 2 wks  Compliant with HEP  Dec pain 25% in left shoulder     LTG by discharge  I HEP  2. Improve functional outcome score to 80-90% on QuickDash indicating improved functional level  3. Patient B. shoulder range of motion within normal limits for patient to be able to reach, and perform personal care without any difficulty  4. Dec pain 0-2/10 to be able to sleep through the night  5.B. shoulder strength 5/5 without pain by discharge to be able to perform daily activities and recreational activities  6. Patient returns to prior to injury functional level        Rehab Potential: Good     Plan: Recommend shoulder and scapular range of motion, stabalization and strangthening.  Manual therapyy and Modalities as needed.      Therapeutic exercise, Manual therapy, Home program instruction and progression, Neuromuscular re-education, Therapeutic activities,  Self care and home management, Instruction in activity modification, Electrical stimulation, Vasopneumatic device + cold, Cryotherapy, Dry Needling, Vasopneumatic device, and Aquatic therapy        1-2 x week for 8 weeks  until goals met or maximum rehab potential met    Plan of care was designed with input and agreement by the patient      Subjective:  Patient had right shoulder pain over 20 years due to snow shoveling incident.   Left shoulder pain since last 3 months.  Patient fell 3 months ago at a base ball game.    Current Episode of Functional Impairment and/or Pain :  Difficulty washing hair, overhead movement, unable to carry a gallon of milk from the fridge, difficulty with ADL, household chores and sleeping.    Pain    Pain assessment 0-10  Pain score: 4-9  Pain location: Anterior shoulder  Exacerbating Factors: Washing hair, overhead movement, unable to carry a gallon of milk from the fridge, difficulty with ADL, household chores and sleeping.  Relieving Factors: Rest    Current Medical management:     PMHx: Reviewed medical history form with patient and medical screening assessed.      Medications for pain:  Ibuprofin       Precautions:    Functional Limitations: Reaching    Home Living Situation: Lives with wife    Prior Level of Function: Independent and active    Patient Goal For Therapy: To be painfree, to be able to wash his hair without pain, return to all his prior to injury functional activity level.     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    Objective:    Posture:  Forward head and rounded sder.    AROM (PROM)  Supine active range of motion within normal limits on the right except IR -0-65.  Left shoulder within normal limits.  Sitting measurements:  R/L shoulder flexion: 0-160 bilaterally, right catching and increased pain 3/10.  R/L shoulder abduction: 0-150/0-160 Pain  shoulder extension: WNL  shoulder IR: WNL, pain  shoulder ER: WNL, pain  elbow  extension: WNL   elbow flexion:WNL Pain       Strength     shoulder flexion: WNL-Pain                 abduction:-Pain                 IR: WNL                 ER: WNL  biceps: Pain on the right 4+/5  triceps: WNL    Special Tests    impingement sign: Positive bilaterally  apleys ER: Positive on the left  apleys IR: Negative  speeds: Positive on the left  Bicep Load 1: Positive on the left  Bicep Load 2: Positive on the left    Palpation:  Pain with palpation bicep tendon on the right    Treatment:   Today's treatment and initial evaluation included:  - Patient education regarding diagnosis, prognosis, contributing factors, comorbidities, activity modification, symptom monitoring, importance of HEP, role of PT, postural re-education, - Review of POC & given HEP handout - access code  Educated and instructed patient following exercises:  Pendulum x 10 each in all directions  Pulleys x 10  Self joint mobs seated on the chair, arm forward and side at the table-sliding forward x 4 reps each.  Green theraband rowing x 10  Towel slide on the wall for shoulder flexion, AROM in all direction x 10 each with or without wand.

## 2023-11-10 ENCOUNTER — EVALUATION (OUTPATIENT)
Dept: PHYSICAL THERAPY | Facility: CLINIC | Age: 68
End: 2023-11-10
Payer: MEDICARE

## 2023-11-10 DIAGNOSIS — M25.511 BILATERAL SHOULDER PAIN: ICD-10-CM

## 2023-11-10 DIAGNOSIS — M75.81 TENDINITIS OF BOTH ROTATOR CUFFS: Primary | ICD-10-CM

## 2023-11-10 DIAGNOSIS — M25.512 BILATERAL SHOULDER PAIN: ICD-10-CM

## 2023-11-10 DIAGNOSIS — M75.82 TENDINITIS OF BOTH ROTATOR CUFFS: Primary | ICD-10-CM

## 2023-11-10 PROCEDURE — 97161 PT EVAL LOW COMPLEX 20 MIN: CPT | Mod: GP

## 2023-11-10 PROCEDURE — 97110 THERAPEUTIC EXERCISES: CPT | Mod: GP

## 2023-11-10 ASSESSMENT — ENCOUNTER SYMPTOMS
LOSS OF SENSATION IN FEET: 0
OCCASIONAL FEELINGS OF UNSTEADINESS: 0
DEPRESSION: 0

## 2023-11-10 NOTE — LETTER
November 10, 2023     Patient: Aleks Boucher   YOB: 1955   Date of Visit: 11/10/2023       To Whom It May Concern:    It is my medical opinion that Aleks Boucher {Work release (duty restriction):98202}.    If you have any questions or concerns, please don't hesitate to call.         Sincerely,        Bradley Mcneal, PT    CC: No Recipients

## 2023-11-10 NOTE — LETTER
November 10, 2023     Patient: Aleks Boucher   YOB: 1955   Date of Visit: 11/10/2023       To Whom it May Concern:    Aleks Boucher was seen in my clinic on 11/10/2023. He {Return to school/sport:20340}.    If you have any questions or concerns, please don't hesitate to call.         Sincerely,          Bradley Mcneal, PT        CC: No Recipients

## 2023-11-15 ENCOUNTER — OFFICE VISIT (OUTPATIENT)
Dept: DERMATOLOGY | Facility: CLINIC | Age: 68
End: 2023-11-15
Payer: MEDICARE

## 2023-11-15 DIAGNOSIS — D23.9 DERMATOFIBROMA: ICD-10-CM

## 2023-11-15 DIAGNOSIS — L82.1 SEBORRHEIC KERATOSIS: ICD-10-CM

## 2023-11-15 DIAGNOSIS — D22.39 FIBROUS PAPULE OF NOSE: ICD-10-CM

## 2023-11-15 DIAGNOSIS — L57.8 PHOTOAGING OF SKIN: ICD-10-CM

## 2023-11-15 DIAGNOSIS — L81.4 LENTIGO: ICD-10-CM

## 2023-11-15 DIAGNOSIS — D18.01 HEMANGIOMA OF SKIN: ICD-10-CM

## 2023-11-15 DIAGNOSIS — Z85.828 PERSONAL HISTORY OF OTHER MALIGNANT NEOPLASM OF SKIN: Primary | ICD-10-CM

## 2023-11-15 DIAGNOSIS — L81.5 GUTTATE HYPOMELANOSIS: ICD-10-CM

## 2023-11-15 PROCEDURE — 1159F MED LIST DOCD IN RCRD: CPT | Performed by: DERMATOLOGY

## 2023-11-15 PROCEDURE — 3044F HG A1C LEVEL LT 7.0%: CPT | Performed by: DERMATOLOGY

## 2023-11-15 PROCEDURE — 99213 OFFICE O/P EST LOW 20 MIN: CPT | Performed by: DERMATOLOGY

## 2023-11-15 PROCEDURE — 1126F AMNT PAIN NOTED NONE PRSNT: CPT | Performed by: DERMATOLOGY

## 2023-11-15 PROCEDURE — 1036F TOBACCO NON-USER: CPT | Performed by: DERMATOLOGY

## 2023-11-15 PROCEDURE — 1160F RVW MEDS BY RX/DR IN RCRD: CPT | Performed by: DERMATOLOGY

## 2023-11-15 ASSESSMENT — DERMATOLOGY PATIENT ASSESSMENT
ARE YOU AN ORGAN TRANSPLANT RECIPIENT: NO
DO YOU USE A TANNING BED: NO
FOR PATIENTS COMING IN FOR A FOLLOW-UP VISIT - HAVE THERE BEEN ANY CHANGES IN YOUR HEALTH SINCE YOUR LAST VISIT: NO
DO YOU USE SUNSCREEN: OCCASIONALLY
DO YOU HAVE ANY NEW OR CHANGING LESIONS: NO

## 2023-11-15 ASSESSMENT — DERMATOLOGY QUALITY OF LIFE (QOL) ASSESSMENT
ARE THERE EXCLUSIONS OR EXCEPTIONS FOR THE QUALITY OF LIFE ASSESSMENT: NO
RATE HOW EMOTIONALLY BOTHERED YOU ARE BY YOUR SKIN PROBLEM (FOR EXAMPLE, WORRY, EMBARRASSMENT, FRUSTRATION): 0 - NEVER BOTHERED
RATE HOW BOTHERED YOU ARE BY EFFECTS OF YOUR SKIN PROBLEMS ON YOUR ACTIVITIES (EG, GOING OUT, ACCOMPLISHING WHAT YOU WANT, WORK ACTIVITIES OR YOUR RELATIONSHIPS WITH OTHERS): 0 - NEVER BOTHERED
RATE HOW BOTHERED YOU ARE BY SYMPTOMS OF YOUR SKIN PROBLEM (EG, ITCHING, STINGING BURNING, HURTING OR SKIN IRRITATION): 0 - NEVER BOTHERED
WHAT SINGLE SKIN CONDITION LISTED BELOW IS THE PATIENT ANSWERING THE QUALITY-OF-LIFE ASSESSMENT QUESTIONS ABOUT: NONE OF THE ABOVE

## 2023-11-15 ASSESSMENT — ITCH NUMERIC RATING SCALE: HOW SEVERE IS YOUR ITCHING?: 0

## 2023-11-15 ASSESSMENT — PATIENT GLOBAL ASSESSMENT (PGA): PATIENT GLOBAL ASSESSMENT: PATIENT GLOBAL ASSESSMENT:  1 - CLEAR

## 2023-11-15 NOTE — PROGRESS NOTES
Subjective     Aleks Boucher is a 68 y.o. male who presents for the following: Skin Check (Pt her for 6 month FBSE. Hx of BCC, Aks. No concerns today.).     Review of Systems:  No other skin or systemic complaints other than what is documented elsewhere in the note.    The following portions of the chart were reviewed this encounter and updated as appropriate:         Skin Cancer History  No skin cancer on file.      Specialty Problems          Dermatology Problems    Actinic keratosis    Basal cell carcinoma of skin of right lower limb, including hip    Disorder of pigmentation, unspecified    Hemangioma of skin and subcutaneous tissue    Neoplasm of uncertain behavior of skin    Other benign neoplasm of skin of other parts of face    Other seborrheic keratosis    Skin changes due to chronic exposure to nonionizing radiation, unspecified    Skin lesion    Tinea        Objective   Well appearing patient in no apparent distress; mood and affect are within normal limits.    A full examination was performed including scalp, head, eyes, ears, nose, lips, neck, chest, axillae, abdomen, back, buttocks, bilateral upper extremities, bilateral lower extremities, hands, feet, fingers, toes, fingernails, and toenails. All findings within normal limits unless otherwise noted below.    Assessment/Plan   1. Personal history of other malignant neoplasm of skin  Right proximal lateral leg  Well healed scar at site of prior treatment without evidence of recurrence.    There is no evidence of recurrence on clinical examination today, reassurance was provided to the patient. The importance of sun protection was reviewed with the patient including the use of a broad spectrum sunscreen that protects against both UVA/UVB rays, with ingredients such as Zinc oxide or titanium dioxide, wearing sun protective clothing and sun avoidance. Warning signs of non-melanoma skin cancer were reviewed. ABCDEs of melanoma reviewed. Patient to f/u  should they notice any new or changing pre-existing skin lesion    Related Procedures  Follow Up In Dermatology - Established Patient    2. Photoaging of skin  Mottled pigmentation with telangiectasias and brown reticular macules in sun exposed areas of the body.    The risk of chronic, cumulative sun damage and risk of development of skin cancer was reviewed today.   The importance of sun protection was reviewed: including the use of a broad spectrum sunscreen that protects against both UVA/UVB rays, with ingredients such as Zinc oxide or titanium dioxide, wearing sun protective clothing and sun avoidance. We reviewed the warning signs of non-melanoma skin cancer and ABCDEs of melanoma  Please follow up should you notice any new or changing pre-existing skin lesion.    Related Procedures  Follow Up In Dermatology - Established Patient    3. Dermatofibroma (2)  Left Lower Leg - Anterior, Left Upper Back  Firm pink papule with hyperpigmented halo, +dimple sign    Benign, scar tissue like growth that most frequently is due to bug bite or ingrown hair. No treatment is necessary.    4. Seborrheic keratosis  Brown, tan waxy macules and stuck on appearing papules and plaques    The benign nature of these skin lesions reviewed, reassure provided and no further treatment needed at this time.   These lesions can be removed, if symptomatic (itching, bleeding, rubbing on clothing, painful), otherwise removal is considered cosmetic.     5. Hemangioma of skin  Cherry red papules    The benign nature of these skin lesions were reviewed, no treatment is necessary.   Please follow up for any new or pre-existing lesion that is changing in size, shape, color, becomes painful, tender, itches or bleed.    6. Fibrous papule of nose  Mid Tip of Nose  Pink dome shaped papule    The benign nature of these skin lesions were reviewed, no treatment is necessary.   Please follow up for any new or pre-existing lesion that is changing in size,  shape, color, becomes painful, tender, itches or bleed.    7. Lentigo  Scattered tan macules in sun-exposed areas.    These are benign skin lesions due to sun exposure. They will darken in response to sun exposure. They should be monitored for change in size, shape or color.  These lesions can be treated cosmetically with topical creams, liquid nitrogen and a variety of lasers.    8. Guttate hypomelanosis (2)  Left Forearm - Posterior, Right Forearm - Posterior  Hypopigmented white macules    Benign, secondary to sun exposure.      Follow up in 6 months for FBSE

## 2023-11-17 ENCOUNTER — DOCUMENTATION (OUTPATIENT)
Dept: PHYSICAL THERAPY | Facility: CLINIC | Age: 68
End: 2023-11-17
Payer: MEDICARE

## 2023-11-17 ENCOUNTER — APPOINTMENT (OUTPATIENT)
Dept: PHYSICAL THERAPY | Facility: CLINIC | Age: 68
End: 2023-11-17
Payer: MEDICARE

## 2023-11-20 ENCOUNTER — TREATMENT (OUTPATIENT)
Dept: PHYSICAL THERAPY | Facility: CLINIC | Age: 68
End: 2023-11-20
Payer: MEDICARE

## 2023-11-20 ENCOUNTER — OFFICE VISIT (OUTPATIENT)
Dept: PRIMARY CARE | Facility: CLINIC | Age: 68
End: 2023-11-20
Payer: MEDICARE

## 2023-11-20 VITALS — SYSTOLIC BLOOD PRESSURE: 144 MMHG | DIASTOLIC BLOOD PRESSURE: 98 MMHG

## 2023-11-20 DIAGNOSIS — M25.511 BILATERAL SHOULDER PAIN: Primary | ICD-10-CM

## 2023-11-20 DIAGNOSIS — J06.9 VIRAL URI WITH COUGH: Primary | ICD-10-CM

## 2023-11-20 DIAGNOSIS — M75.81 TENDINITIS OF BOTH ROTATOR CUFFS: ICD-10-CM

## 2023-11-20 DIAGNOSIS — M75.82 TENDINITIS OF BOTH ROTATOR CUFFS: ICD-10-CM

## 2023-11-20 DIAGNOSIS — M25.512 BILATERAL SHOULDER PAIN: Primary | ICD-10-CM

## 2023-11-20 PROCEDURE — 3044F HG A1C LEVEL LT 7.0%: CPT | Performed by: STUDENT IN AN ORGANIZED HEALTH CARE EDUCATION/TRAINING PROGRAM

## 2023-11-20 PROCEDURE — 99213 OFFICE O/P EST LOW 20 MIN: CPT | Performed by: STUDENT IN AN ORGANIZED HEALTH CARE EDUCATION/TRAINING PROGRAM

## 2023-11-20 PROCEDURE — 3077F SYST BP >= 140 MM HG: CPT | Performed by: STUDENT IN AN ORGANIZED HEALTH CARE EDUCATION/TRAINING PROGRAM

## 2023-11-20 PROCEDURE — 3080F DIAST BP >= 90 MM HG: CPT | Performed by: STUDENT IN AN ORGANIZED HEALTH CARE EDUCATION/TRAINING PROGRAM

## 2023-11-20 PROCEDURE — 1160F RVW MEDS BY RX/DR IN RCRD: CPT | Performed by: STUDENT IN AN ORGANIZED HEALTH CARE EDUCATION/TRAINING PROGRAM

## 2023-11-20 PROCEDURE — 1036F TOBACCO NON-USER: CPT | Performed by: STUDENT IN AN ORGANIZED HEALTH CARE EDUCATION/TRAINING PROGRAM

## 2023-11-20 PROCEDURE — 97110 THERAPEUTIC EXERCISES: CPT | Mod: GP

## 2023-11-20 PROCEDURE — 97140 MANUAL THERAPY 1/> REGIONS: CPT | Mod: GP

## 2023-11-20 PROCEDURE — 1159F MED LIST DOCD IN RCRD: CPT | Performed by: STUDENT IN AN ORGANIZED HEALTH CARE EDUCATION/TRAINING PROGRAM

## 2023-11-20 PROCEDURE — 1126F AMNT PAIN NOTED NONE PRSNT: CPT | Performed by: STUDENT IN AN ORGANIZED HEALTH CARE EDUCATION/TRAINING PROGRAM

## 2023-11-20 NOTE — PROGRESS NOTES
"                                                                                                                                                                                                                                                   PHYSICAL THERAPY TREATMENT NOTE    Patient Name:  Aleks Boucher \"Abbe\"   Patient MRN: [id number]  Date: 11/20/23       Insurance:  Visit number: 2  Insurance Type:   MEDICARE MEDICARE PART A AND B       Authorization or Plan of Care date Range:     Therapy diagnoses:   1. Bilateral shoulder pain        2. 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  Patient was challenged by the exercises, his abduction exercises required modification with range of motion.  No complaints of pain after the exercise session today.  Improved range of motion noted.  Decreased trigger points after manual therapy noted.    Plan:  Continue with range of motion, strengthening and postural exercise     Subjective:   Patient reports he raked the leaves and pain level increased 5/10,  today 1-2/10   Pain (0-10): 1-2    HEP adherence / understanding: Semi    Objective:  Palpation:  Moderate tightness/trigger points in mid deltoid area    Treatment Performed: (\"NP\" = Not Performed) (** =New exercises)   UBE x 6 min 3 forward and 3 back  Towel stretches on the wall for shoulder flexion x 10  Wand abduction an extension x 10**  Self mobilization-arm on the table forward and side, scooting the chair back x 5-10 each  Theraband shoulder rowing and extension with green theraband x 10 reps x 2 sets at waist level  Overhead shoulder extension x 10 x 1**  Bilateral shoulder external rotation with elbow bent x 10 x " 1**  Bilateral shoulder abduction with green theraband elbow straight x 10 x 1**, required to decrease range of motion to avoid pain.  Bent over Rowing with 3 lb dumbbell x 10**  Corner wall pushes x 10**  Issued written instructions on the new exercises.    Manual therapy: Patient received trigger point release, STM using [employer] scanner for 10 min.

## 2023-11-20 NOTE — PROGRESS NOTES
Subjective   Patient ID: Abbe Boucher is a 68 y.o. male who presents for Cough (Cough,x 2 days/Covid Neg).    HPI   Sick visit.    Patient says that he has had a cough for 2-3 days. Wife started with similar symptoms today. Minimal nasal drainage. No sore throat. No fevers. No significant SoB. COVID test was negative.    Review of Systems  12-point ROS reviewed and was negative unless otherwise noted in HPI.    Objective   There were no vitals taken for this visit.    Physical Exam  GEN: conversant, NAD  HEENT: PERRL, EOMI, MMM  NECK: supple, no LAD  CV: S1, S2, RRR  PULM: CTAB  ABD: soft, NT, obese  NEURO: no new gross focal deficits  EXT: no sig LE edema  PSYCH: appropriate affect    Assessment/Plan     Sick visit.    #Viral URI with cough: advised supportive care. Maintain adequate hydration. Wear a mask when around others. He will let us know if symptoms persist or worsen, would prescribe antibiotics in that event.    #H/o VT arrest s/p ICD  Follows with EP and getting ICD replacement soon.      #Permanent A. fib  Well-controlled  Following with cardiology     #Hyperlipidemia  Continue atorvastatin 10mg daily, check lipids     #Diabetes, type 2  Continue Ozempic dose to 0.5mg, discussed healthy lifestyle habits.   Check A1c.  Seeing endocrinology  - The patient has been seen for a face-to-face evaluation. The patient is diabetic. I am recommending the patient for diabetic shoes/inserts based on qualifying conditions listed on cmn.     #Hypothyroidism  Continue levothyroxine     #BPH - continue tamsulosin     #Health maintenance: he says that he had a colonoscopy in 2022, no polyps. Advised Shingrix and yearly flu shot. Received COVID-19 vaccines. Received Pneumovax 23. He declines TDaP.     RTC 3 months

## 2023-11-28 ENCOUNTER — OFFICE VISIT (OUTPATIENT)
Dept: CARDIOLOGY | Facility: CLINIC | Age: 68
End: 2023-11-28
Payer: MEDICARE

## 2023-11-28 VITALS
WEIGHT: 294 LBS | DIASTOLIC BLOOD PRESSURE: 84 MMHG | HEART RATE: 74 BPM | BODY MASS INDEX: 37.75 KG/M2 | OXYGEN SATURATION: 96 % | SYSTOLIC BLOOD PRESSURE: 132 MMHG | RESPIRATION RATE: 16 BRPM

## 2023-11-28 DIAGNOSIS — Z98.890 H/O MITRAL VALVE REPAIR: ICD-10-CM

## 2023-11-28 DIAGNOSIS — I05.9 MITRAL VALVE DISEASE: ICD-10-CM

## 2023-11-28 DIAGNOSIS — Z95.810 IMPLANTABLE CARDIOVERTER-DEFIBRILLATOR (ICD) IN SITU: ICD-10-CM

## 2023-11-28 DIAGNOSIS — I48.11 LONGSTANDING PERSISTENT ATRIAL FIBRILLATION (MULTI): ICD-10-CM

## 2023-11-28 DIAGNOSIS — I10 PRIMARY HYPERTENSION: ICD-10-CM

## 2023-11-28 DIAGNOSIS — Z01.818 ENCOUNTER FOR PREOPERATIVE DENTAL EXAMINATION: Primary | ICD-10-CM

## 2023-11-28 DIAGNOSIS — Z86.718 HISTORY OF RECURRENT DEEP VEIN THROMBOSIS (DVT): ICD-10-CM

## 2023-11-28 PROBLEM — B35.9 TINEA: Status: RESOLVED | Noted: 2023-11-01 | Resolved: 2023-11-28

## 2023-11-28 PROBLEM — M25.511 BILATERAL SHOULDER PAIN: Status: RESOLVED | Noted: 2023-11-10 | Resolved: 2023-11-28

## 2023-11-28 PROBLEM — I82.4Y9 DEEP VEIN THROMBOSIS (DVT) OF PROXIMAL LOWER EXTREMITY, UNSPECIFIED CHRONICITY, UNSPECIFIED LATERALITY (MULTI): Status: RESOLVED | Noted: 2023-04-05 | Resolved: 2023-11-28

## 2023-11-28 PROBLEM — R00.1 BRADYCARDIA: Status: RESOLVED | Noted: 2022-12-29 | Resolved: 2023-11-28

## 2023-11-28 PROBLEM — I95.9 HYPOTENSION: Status: RESOLVED | Noted: 2022-12-29 | Resolved: 2023-11-28

## 2023-11-28 PROBLEM — R04.0 EPISTAXIS: Status: RESOLVED | Noted: 2022-12-29 | Resolved: 2023-11-28

## 2023-11-28 PROBLEM — N17.9 AKI (ACUTE KIDNEY INJURY) (CMS-HCC): Status: RESOLVED | Noted: 2022-12-29 | Resolved: 2023-11-28

## 2023-11-28 PROBLEM — M25.512 BILATERAL SHOULDER PAIN: Status: RESOLVED | Noted: 2023-11-10 | Resolved: 2023-11-28

## 2023-11-28 PROBLEM — L81.9 DISORDER OF PIGMENTATION, UNSPECIFIED: Status: RESOLVED | Noted: 2023-06-21 | Resolved: 2023-11-28

## 2023-11-28 PROCEDURE — 1126F AMNT PAIN NOTED NONE PRSNT: CPT | Performed by: STUDENT IN AN ORGANIZED HEALTH CARE EDUCATION/TRAINING PROGRAM

## 2023-11-28 PROCEDURE — 3079F DIAST BP 80-89 MM HG: CPT | Performed by: STUDENT IN AN ORGANIZED HEALTH CARE EDUCATION/TRAINING PROGRAM

## 2023-11-28 PROCEDURE — 3075F SYST BP GE 130 - 139MM HG: CPT | Performed by: STUDENT IN AN ORGANIZED HEALTH CARE EDUCATION/TRAINING PROGRAM

## 2023-11-28 PROCEDURE — 1036F TOBACCO NON-USER: CPT | Performed by: STUDENT IN AN ORGANIZED HEALTH CARE EDUCATION/TRAINING PROGRAM

## 2023-11-28 PROCEDURE — 3044F HG A1C LEVEL LT 7.0%: CPT | Performed by: STUDENT IN AN ORGANIZED HEALTH CARE EDUCATION/TRAINING PROGRAM

## 2023-11-28 PROCEDURE — 1160F RVW MEDS BY RX/DR IN RCRD: CPT | Performed by: STUDENT IN AN ORGANIZED HEALTH CARE EDUCATION/TRAINING PROGRAM

## 2023-11-28 PROCEDURE — 99213 OFFICE O/P EST LOW 20 MIN: CPT | Performed by: STUDENT IN AN ORGANIZED HEALTH CARE EDUCATION/TRAINING PROGRAM

## 2023-11-28 PROCEDURE — 1159F MED LIST DOCD IN RCRD: CPT | Performed by: STUDENT IN AN ORGANIZED HEALTH CARE EDUCATION/TRAINING PROGRAM

## 2023-11-28 RX ORDER — DOXYCYCLINE 100 MG/1
CAPSULE ORAL
Qty: 1 CAPSULE | Refills: 0 | Status: SHIPPED | OUTPATIENT
Start: 2023-11-28 | End: 2024-01-31 | Stop reason: ALTCHOICE

## 2023-11-28 ASSESSMENT — ENCOUNTER SYMPTOMS
PALPITATIONS: 0
MUSCULOSKELETAL NEGATIVE: 1
CONSTITUTIONAL NEGATIVE: 1
HEMATOLOGIC/LYMPHATIC NEGATIVE: 1
NEUROLOGICAL NEGATIVE: 1
NEAR-SYNCOPE: 0
ORTHOPNEA: 0
RESPIRATORY NEGATIVE: 1
ENDOCRINE NEGATIVE: 1
PND: 0
GASTROINTESTINAL NEGATIVE: 1
SYNCOPE: 0
PSYCHIATRIC NEGATIVE: 1
DYSPNEA ON EXERTION: 0

## 2023-11-28 NOTE — PROGRESS NOTES
"    [facility] Cardiology Outpatient Follow-up Visit     Reason for Visit: paroxysmal afib; hx MVR; hx VF s/p secondary prevention ICD.     HPI: Aleks Boucher \"Abbe\" is a 68 y.o.  male who presents today for a follow-up for paroxysmal afib; hx MVR; hx VF s/p secondary prevention ICD. Past medical history of MV repair (12/15/2008 @ [facility]), hx VF arrest s/p secondary prevention ICD ([employer] > s/p generator changeout 4/6/2023), atrial fibrillation (s/p mini MAZE 12/2008; on rivaroxaban), non-insulin dependent DM, and remote hx of DVT.      Patient had moved back from [place] to the [facility]. Previously follow under care of cardiology team at [facility]. Records requested per Dr. Ramicone. Patient has reestablished with electrophysiology and was referred to establish care with general cardiology.      Aleks presented to clinic on 11/11/2022. No active cardiac complaints at this time. No chest pain, dyspnea, orthopnea, PND, syncope, pre-syncope, or ICD discharges. Tolerating systemic anticoagulant without significant bleeding issues.      Aleks returned to cardiology clinic on 5/26/2023. S/p recent ICD generator change out due to end of device life. S/p [employer] ICD 4/6/2023 with Dr. Ramicone. No acute cardiac complaints at this time. Tolerating activity without significant limitations. Euvolemic on exam. ICD pocket well-healed.     Aleks returned to cardiology clinic on 11/28/2023. Patient getting dental cleaning; crown, and pin placement by his dental team in near future. Given ICD and history of MV repair > patient will need dental prophylaxis.  Prior history of allergy to penicillins; will use doxycycline 100 mg once ~ 30 minutes prior to procedure. Patient is also on chronic systemic anticoagulation for atrial fibrillation and remote hx of DVT with rivaroxaban; patient instructed to hold rivaroxaban 48 hours prior to procedure and to resume evening after dental procedure. "     Tolerating rivaroxaban without significant bleeding episodes.  No active cardiac complaints at this time.  No recent ICD shocks.  BP is well-controlled. Rare episodes of light headedness when transitioning from sitting to standing.  No syncope. No falls.     Past Medical History:   - As above    Surgical History:   - As above    Family History:   Family History   Problem Relation Name Age of Onset    Cancer Mother      Diabetes Mother      Coronary artery disease Father         Allergies:  Amoxil [amoxicillin], Neomycin-bacitracin-polymyxin, and Penicillins     Social History:   - Never a smoker; no significant alcohol use; no illicit drugy use   - Works at [employer]; more active than prior     Prior Cardiovascular Testing (Personally Reviewed):     EKG October 21, 2022: Atrial fibrillation with a controlled ventricular response.      Lipid Panel (10/2022)- total cholesterol 156, HDL 41, LDL 75, Triglycerides 198 mg/dl     Hgb A1 C (10/2022)- 6.1%       Review of Systems:  Review of Systems   Constitutional: Negative.   HENT: Negative.     Cardiovascular:  Negative for chest pain, dyspnea on exertion, near-syncope, orthopnea, palpitations, paroxysmal nocturnal dyspnea and syncope.        Compression stocking in place   Respiratory: Negative.     Endocrine: Negative.    Hematologic/Lymphatic: Negative.    Skin: Negative.    Musculoskeletal: Negative.    Gastrointestinal: Negative.    Genitourinary: Negative.    Neurological: Negative.    Psychiatric/Behavioral: Negative.         Outpatient Medications:    Current Outpatient Medications:     albuterol 90 mcg/actuation inhaler, Inhale 1 puff every 6 hours if needed., Disp: , Rfl:     atorvastatin (Lipitor) 10 mg tablet, Take 1 tablet (10 mg) by mouth once daily., Disp: 90 tablet, Rfl: 1    clindamycin (Cleocin) 150 mg capsule, Take 2 capsules (300 mg) by mouth. 1 HOUR PRIOR TO DENTAL APPOINTMENT. TAKE 1 CAPSULE 6 HOURS AFTER APPOINTMENT., Disp: , Rfl:      coenzyme Q-10 200 mg capsule, Coenzyme Q10 200 MG CAPS Refills: 0, Disp: , Rfl:     FreeStyle Lite Strips strip, use to test once daily, Disp: , Rfl:     gabapentin (Neurontin) 300 mg capsule, Take 1 capsule (300 mg) by mouth 3 times a day., Disp: 270 capsule, Rfl: 1    ginkgo biloba 40 mg tablet, Take 120 mg by mouth once daily., Disp: , Rfl:     KRILL OIL ORAL, Take 1,000 mg by mouth once daily., Disp: , Rfl:     levothyroxine (Synthroid, Levoxyl) 50 mcg tablet, Take 1 tablet (50 mcg) by mouth once daily., Disp: 90 tablet, Rfl: 1    metoprolol succinate XL (Toprol-XL) 100 mg 24 hr tablet, Take 1 tablet (100 mg) by mouth 2 times a day., Disp: , Rfl:     multivitamin-minerals, adult, (Centrum) 0.4-162-18 mg tablet, Take 1 tablet by mouth 2 times a day., Disp: , Rfl:     Symbicort 80-4.5 mcg/actuation inhaler, Inhale 2 puffs 2 times a day., Disp: , Rfl:     tamsulosin (Flomax) 0.4 mg 24 hr capsule, Take 1 capsule (0.4 mg) by mouth once daily at bedtime., Disp: 90 capsule, Rfl: 1    doxycycline (Monodox) 100 mg capsule, Take doxycycline 100 mg by mouth 30 minutes prior to dental procedure., Disp: 1 capsule, Rfl: 0    semaglutide 0.25 mg or 0.5 mg (2 mg/3 mL) pen injector, Inject 0.5 mg under the skin 1 (one) time per week., Disp: 0.5 mL, Rfl: 11    Xarelto 20 mg tablet, Take 1 tablet (20 mg) by mouth once daily in the evening. Take with meals., Disp: , Rfl:      Last Recorded Vitals  /84 (BP Location: Right arm, Patient Position: Sitting)   Pulse 74   Resp 16   Wt 133 kg (294 lb)   SpO2 96%   BMI 37.75 kg/m²     Physical Exam:    Physical Exam  Constitutional:       Appearance: He is obese.   HENT:      Head: Normocephalic.      Mouth/Throat:      Mouth: Mucous membranes are moist.   Eyes:      Extraocular Movements: Extraocular movements intact.      Conjunctiva/sclera: Conjunctivae normal.   Neck:      Vascular: No JVD.   Cardiovascular:      Rate and Rhythm: Normal rate and regular rhythm.  "  Pulmonary:      Effort: Pulmonary effort is normal. No respiratory distress.      Breath sounds: Normal breath sounds.   Abdominal:      Palpations: Abdomen is soft.   Musculoskeletal:         General: No swelling.   Skin:     General: Skin is warm and dry.   Neurological:      General: No focal deficit present.      Mental Status: He is alert.      Cranial Nerves: No cranial nerve deficit.      Motor: No weakness.   Psychiatric:         Mood and Affect: Mood normal.         Behavior: Behavior normal.         Lab/Radiology/Diagnostic Review:    Labs    Lab Results   Component Value Date    GLUCOSE 109 (H) 09/21/2023    CALCIUM 10.0 09/21/2023     09/21/2023    K 4.6 09/21/2023    CO2 24 09/21/2023     09/21/2023    BUN 17 09/21/2023    CREATININE 1.09 09/21/2023       Lab Results   Component Value Date    WBC 6.8 09/21/2023    HGB 15.4 09/21/2023    HCT 44.8 09/21/2023    MCV 97 09/21/2023     09/21/2023       Lab Results   Component Value Date    CHOL 148 09/21/2023    CHOL 156 10/27/2022     Lab Results   Component Value Date    HDL 46.7 09/21/2023    HDL 41.0 10/27/2022     No results found for: \"LDLCALC\"  Lab Results   Component Value Date    TRIG 222 (H) 09/21/2023    TRIG 198 (H) 10/27/2022       Lab Results   Component Value Date    TSH 2.47 09/21/2023       Assessment:   68 y.o.  male who presents today for a follow-up for paroxysmal afib; hx MVR; hx VF s/p secondary prevention ICD. Past medical history of MV repair (12/15/2008 @ [facility]), hx VF arrest s/p secondary prevention ICD ([employer] > s/p generator changeout 4/6/2023), atrial fibrillation (s/p mini MAZE 12/2008; on rivaroxaban), non-insulin dependent DM, and remote hx of DVT.     Aleks returned to cardiology clinic on 11/28/2023. Patient getting dental cleaning; crown, and pin placement by his dental team in near future. Given ICD and history of MV repair > patient will need dental prophylaxis.  Prior history of " allergy to penicillins; will use doxycycline 100 mg once ~ 30 minutes prior to procedure. Patient is also on chronic systemic anticoagulation for atrial fibrillation and remote hx of DVT with rivaroxaban; patient instructed to hold rivaroxaban 48 hours prior to procedure and to resume evening after dental procedure.     Overall Plan:  1. Atrial fibrillation- continue rivaroxaban 20 mg daily; metoprolol succinate 100 mg daily; currently well-rate controlled and asymptomatic (11/28/2023)     2. Hx MV regurgitation s/p repair- appears euvolemic on exam; no clinical evidence of decompensation; patient need dental prophylaxis as he is s/p MV repair prior to any dental procedures     3. DLD (goal LDL < 100)- continue atorvastatin     4. Hx of DVT- continue rivaroxaban as above     5. Hx VF s/p secondary prevention ICD ([employer])- follow-up as directed with EP; continue beta blockade; S/P ICD generator exchange 4/6/2023 due to end of device life     6. HTN- well-controlled; continue current regiment    7. Pre-op dental work / crown placement- Given ICD and history of MV repair > patient will need dental prophylaxis.  Prior history of allergy to penicillins; will use doxycycline 100 mg once ~ 30 minutes prior to procedure.    Disposition- Return to cardiology clinic in ~ 8-12 months    Thank you for your visit today. Please contact our office with any questions.     Andry Lopez MD

## 2023-11-28 NOTE — PATIENT INSTRUCTIONS
OK to hold rivaroxaban (Xarelto) 48 hour prior to dental procedure; resume rivaroxiban after dental procedure.     Because of your history of mitral valve repair and defibrillator placement, you will need pre-dental procedure / cleaning antibiotics; because of your history of penicillin allergy we will have you take doxycyline 100 mg once ~ 30 minutes prior to your dental procedure.      We will see you back in cardiology clinic in ~ 8-12 months.      Thank you for your visit today. Please contact our office (via [employer] or phone) with any additional questions.     [facility]    Arleen, AMINA/Clinic Nurse for:    Dr. Jessica Chaparro    [address], [address]  [place] [address]    Phone: [phone] Press Option 5 then Option 3 to speak with the Clinic Nurse (Arleen)    _____    To Reach:    Billing Questions -    [phone]  Scheduling / Rescheduling -  Option 1  Refills / Medication Requests -  Option 3  General Office / [place] -  Option 4  Results -     Option 6  Medical Records -    Option 7  Repeat Options -    Option 9

## 2023-12-01 ENCOUNTER — TREATMENT (OUTPATIENT)
Dept: PHYSICAL THERAPY | Facility: CLINIC | Age: 68
End: 2023-12-01
Payer: MEDICARE

## 2023-12-01 DIAGNOSIS — M75.82 TENDINITIS OF BOTH ROTATOR CUFFS: ICD-10-CM

## 2023-12-01 DIAGNOSIS — M75.81 TENDINITIS OF BOTH ROTATOR CUFFS: ICD-10-CM

## 2023-12-01 DIAGNOSIS — M25.511 BILATERAL SHOULDER PAIN: Primary | ICD-10-CM

## 2023-12-01 DIAGNOSIS — M25.512 BILATERAL SHOULDER PAIN: Primary | ICD-10-CM

## 2023-12-01 PROCEDURE — 97124 MASSAGE THERAPY: CPT | Mod: GP

## 2023-12-01 PROCEDURE — 97110 THERAPEUTIC EXERCISES: CPT | Mod: GP

## 2023-12-01 PROCEDURE — 97140 MANUAL THERAPY 1/> REGIONS: CPT | Mod: GP

## 2023-12-01 NOTE — PROGRESS NOTES
"                                                                                                                                                                                                                                                   PHYSICAL THERAPY TREATMENT NOTE    Patient Name:  Aleks Boucher \"Abbe\"   Patient MRN: [id number]  Date: 12/01/23  Time Calculation  Start Time: 0859    Insurance:  Visit number: 3  Insurance Type:   MEDICARE MEDICARE PART A AND B       Authorization or Plan of Care date Range:     Therapy diagnoses:   1. Bilateral shoulder pain  Follow Up In Physical Therapy      2. 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  Patient required verbal cues with technique and form.  Patient was challenged by the exercises,  Patient reports isometrics exercises being helpful. Decreased trigger points after manual therapy noted, patient reacted well to STM with a tennis ball instead of IASTM.    Plan:  Continue with range of motion, strengthening and postural exercise     Subjective:   Patient reports he is more flexible compared to before pain level 2/10   Pain (0-10): 2    HEP adherence / understanding: Semi    Functional Progress:  Able to reach higher    Objective:  Palpation:  Moderate tightness/trigger points in mid deltoid area    Treatment Performed: (\"NP\" = Not Performed) (** =New exercises)   UBE x 6 min 3 forward and 3 back  Isometric shoulder ER, IR, flexion, extension 45 sec x 2 each**  Bilateral shoulder external rotation with elbow bent x 10 x 1  Bilateral shoulder abduction with green theraband elbow straight x 10 x 1, required to decrease range of motion to avoid " pain.  Bent over Rowing with 3 lb dumbbell x 10  Corner wall pushes x 10  Cable column rowing at waist level with 5 lbs x 10 reps  Cable column extension at waist level with 5 lbs x 10  Issued written instructions on the new exercises.  Not performed today:  Towel stretches on the wall for shoulder flexion x 10  Wand abduction an extension x 10  Self mobilization-arm on the table forward and side, scooting the chair back x 5-10 each-  Theraband shoulder rowing and extension with green theraband x 10 reps x 2 sets at waist level-  Overhead shoulder extension x 10 x 1    Manual therapy: Patient received trigger point release, STM using a blue ball for 14 min.

## 2023-12-04 PROBLEM — L57.9 SKIN CHANGES DUE TO CHRONIC EXPOSURE TO NONIONIZING RADIATION, UNSPECIFIED: Status: RESOLVED | Noted: 2023-06-21 | Resolved: 2023-12-04

## 2023-12-08 ENCOUNTER — TREATMENT (OUTPATIENT)
Dept: PHYSICAL THERAPY | Facility: CLINIC | Age: 68
End: 2023-12-08
Payer: MEDICARE

## 2023-12-08 DIAGNOSIS — M75.82 TENDINITIS OF BOTH ROTATOR CUFFS: ICD-10-CM

## 2023-12-08 DIAGNOSIS — M25.512 BILATERAL SHOULDER PAIN: ICD-10-CM

## 2023-12-08 DIAGNOSIS — M75.81 TENDINITIS OF BOTH ROTATOR CUFFS: ICD-10-CM

## 2023-12-08 DIAGNOSIS — M25.511 BILATERAL SHOULDER PAIN: ICD-10-CM

## 2023-12-08 PROCEDURE — 97110 THERAPEUTIC EXERCISES: CPT | Mod: GP

## 2023-12-08 PROCEDURE — 97140 MANUAL THERAPY 1/> REGIONS: CPT | Mod: GP

## 2023-12-08 NOTE — PROGRESS NOTES
"                                                                                                                                                                                                                                                   PHYSICAL THERAPY TREATMENT NOTE    Patient Name:  Aleks Boucher \"Abbe\"   Patient MRN: [id number]  Date: 12/08/23       Insurance:  Visit number: 4  Insurance Type:   MEDICARE MEDICARE PART A AND B       Authorization or Plan of Care date Range:     Therapy diagnoses:   No diagnosis found.         General:    Referred by: Bienvenido Cotter MD         Precautions:  Defibrillator implanted 12/2008  Mitrav valve repair 12/2008  On blood thinner  H/o DVT  Fall Risk: Low  No fall since last visit  Outcome Measure:  Patient scored 20.4% on QuickDash      Assessment:  Patient reports deep tissue massage was not helpful.  Patient required verbal cues with technique and form.  Patient was challenged by the exercises on the left, reduced resistance to accommodate patient's symptoms.  Responded well to joint mobilization. No complaints of pain at the end of session.  Patient reports isometrics exercises being helpful.  Patient demonstrated full range of motion after joint mobilization  Plan:  Continue with range of motion, strengthening and postural exercise     Subjective:   Patient reports he is more flexible no pain, has been performing isometrics prior to other exercises   Pain (0-10): 0    HEP adherence / understanding: Semi    Functional Progress:  Able to reach higher    Objective:  Palpation:  Moderate tightness/trigger points in mid deltoid area    Treatment Performed: (\"NP\" = Not Performed) (** =New exercises)   UBE x 6 min 3 forward and 3 back  Isometric shoulder ER, IR, flexion, extension 45 sec x 2 each**  Bilateral shoulder external rotation with elbow bent x 10 x 1  Bilateral shoulder abduction with green theraband elbow straight x 10 x 1, required to decrease range of motion " to avoid pain.  Bent over Rowing with increased resistance 5 lb dumbbell x 10  Corner wall pushes x 10  Cable column rowing at waist level and overhead with 10 lbs x 10 reps  Cable column extension at waist level with 5 lbs and overhead with 10 lbs x 10**  Serratus punches with gray theraband x 10**  Standing shoulder flexion, abduction and scaption with 2# right shoulder, Left with 1# dumbell with flexion and scaption, abduction no resistance.  Bent over abduction no resistance with left, 1# with right.    Issued written instructions on the new exercises.    Not performed today:  Towel stretches on the wall for shoulder flexion x 10  Wand abduction an extension x 10  Self mobilization-arm on the table forward and side, scooting the chair back x 5-10 each-  Theraband shoulder rowing and extension with green theraband x 10 reps x 2 sets at waist level-  Overhead shoulder extension x 10 x 1    Manual therapy: Patient received joint mobilization to bilateral shoulder distraction, oscillation, AP and inferior mobs grade II and grade III followed by passive range of motion.

## 2023-12-12 ENCOUNTER — APPOINTMENT (OUTPATIENT)
Dept: PHYSICAL THERAPY | Facility: CLINIC | Age: 68
End: 2023-12-12
Payer: MEDICARE

## 2023-12-13 ENCOUNTER — TREATMENT (OUTPATIENT)
Dept: PHYSICAL THERAPY | Facility: CLINIC | Age: 68
End: 2023-12-13
Payer: MEDICARE

## 2023-12-13 DIAGNOSIS — M25.511 BILATERAL SHOULDER PAIN: ICD-10-CM

## 2023-12-13 DIAGNOSIS — M25.512 BILATERAL SHOULDER PAIN: ICD-10-CM

## 2023-12-13 DIAGNOSIS — M75.81 TENDINITIS OF BOTH ROTATOR CUFFS: ICD-10-CM

## 2023-12-13 DIAGNOSIS — M75.82 TENDINITIS OF BOTH ROTATOR CUFFS: ICD-10-CM

## 2023-12-13 PROCEDURE — 97140 MANUAL THERAPY 1/> REGIONS: CPT | Mod: GP | Performed by: PHYSICAL THERAPIST

## 2023-12-13 PROCEDURE — 97110 THERAPEUTIC EXERCISES: CPT | Mod: GP | Performed by: PHYSICAL THERAPIST

## 2023-12-13 NOTE — PROGRESS NOTES
"                                                                                                                                                                                                                                                   PHYSICAL THERAPY TREATMENT NOTE    Patient Name:  Aleks Boucher \"Abbe\"   Patient MRN: [id number]  Date: 12/13/23  Time Calculation  Start Time: 1115  Stop Time: 1200  Time Calculation (min): 45 min    Insurance:  Visit number: 5  Insurance Type:   MEDICARE MEDICARE PART A AND B       Authorization or Plan of Care date Range:     Therapy diagnoses:   1. Bilateral shoulder pain  Follow Up In Physical Therapy      2. Tendinitis of both rotator cuffs  Follow Up In Physical Therapy           General:  Referred by: Bienvenido Cotter MD     Precautions:  Defibrillator implanted 12/2008  Mitrav valve repair 12/2008  On blood thinner  H/o DVT  Fall Risk: Low  No fall since last visit  Outcome Measure:  Patient scored 20.4% on QuickDash      Assessment:  Progress towards functional goals: Improved reaching ability.  Response to interventions: Tolerated treatment session well without any increase in pain. Patient was appropriately fatigued with no complaints.  Justification for continued skilled care: To address remaining functional, objective and subjective deficits to allow them to return to full independence with ADLs.   Plan:  Continue with range of motion, strengthening and postural exercise     Subjective:   Patient reports he feels the same overall, feels progress with reaching but pain is the same   Pain (0-10): 1-2/10 currently   HEP adherence / understanding: good    Functional Progress:  Able to reach higher    Objective:  Palpation:  Moderate tightness/trigger points in mid deltoid area    Treatment Performed: (\"NP\" = Not Performed) (** =New exercises)   UBE x 6 min 3 forward and 3 reverse  Pulleys x 20 ea  TB Green Bilateral shoulder external rotation with elbow bent 10 x 2  TB " green Bilateral shoulder abduction with green theraband elbow straight x 10 x 2  Cable column rowing/ext 15# 2x10 ea, 10# with ext  TB ER/IR Blue 2x10 ea bilateral  Standing shoulder flexion, abduction, scaption, 1# x 10 ea    Manual therapy: Patient received joint mobilization to bilateral shoulder distraction, oscillation, AP and inferior mobs grade II and grade III followed by passive range of motion.

## 2023-12-19 ENCOUNTER — APPOINTMENT (OUTPATIENT)
Dept: PHYSICAL THERAPY | Facility: CLINIC | Age: 68
End: 2023-12-19
Payer: MEDICARE

## 2023-12-20 ENCOUNTER — TREATMENT (OUTPATIENT)
Dept: PHYSICAL THERAPY | Facility: CLINIC | Age: 68
End: 2023-12-20
Payer: MEDICARE

## 2023-12-20 DIAGNOSIS — M75.82 TENDINITIS OF BOTH ROTATOR CUFFS: ICD-10-CM

## 2023-12-20 DIAGNOSIS — M75.81 TENDINITIS OF BOTH ROTATOR CUFFS: ICD-10-CM

## 2023-12-20 DIAGNOSIS — M25.512 BILATERAL SHOULDER PAIN: ICD-10-CM

## 2023-12-20 DIAGNOSIS — M25.511 BILATERAL SHOULDER PAIN: ICD-10-CM

## 2023-12-20 PROCEDURE — 97140 MANUAL THERAPY 1/> REGIONS: CPT | Mod: GP | Performed by: PHYSICAL THERAPIST

## 2023-12-20 PROCEDURE — 97110 THERAPEUTIC EXERCISES: CPT | Mod: GP | Performed by: PHYSICAL THERAPIST

## 2023-12-20 NOTE — PROGRESS NOTES
"                                                                                                                                                                                                                                                   PHYSICAL THERAPY TREATMENT NOTE    Patient Name:  Aleks Boucher \"Abbe\"   Patient MRN: [id number]  Date: 12/20/23  Time Calculation  Start Time: 1030  Stop Time: 1115  Time Calculation (min): 45 min    Insurance:  Visit number: 6  Insurance Type:   MEDICARE MEDICARE PART A AND B       Authorization or Plan of Care date Range: active to 2/5/24    Therapy diagnoses:   1. Bilateral shoulder pain  Follow Up In Physical Therapy      2. Tendinitis of both rotator cuffs  Follow Up In Physical Therapy             General:  Referred by: Bienvenido Cotter MD     Precautions:  Defibrillator implanted 12/2008  Mitrav valve repair 12/2008  On blood thinner  H/o DVT  Fall Risk: Low  No fall since last visit    Outcome Measure:  Patient scored 20.4% on QuickDash    Assessment:  Patient was challenged with progression with added TB exercises. PROM is tight at end ranges  Progress towards functional goals: Improved reaching ability.  Response to interventions: Patient was appropriately fatigued with no complaints.  Justification for continued skilled care: To address remaining functional, objective and subjective deficits to allow them to return to full independence with ADLs.    Plan:  Continue with range of motion, strengthening progression and manual stretching    Subjective:   Patient reports he is not worse but still has his same level of pain   Pain (0-10): 1-2/10 currently   HEP adherence / understanding: good    Functional Progress:  less pain with reaching    Objective:  AROM with elevation is 160 deg bilateral, 110 deg painful arc R side    Treatment Performed: (\"NP\" = Not Performed)   UBE x 6 min 3 forward and 3 reverse  Pulleys x 20 ea  TB row/ext black 2x10 ea  TB Green Bilateral " shoulder external rotation with elbow bent 10 x 2-NP  TB green Bilateral shoulder abduction with green theraband elbow straight x 10 x 2-NP  TB ER/IR Blue 2x10 ea bilateral  Standing shoulder flexion, abduction, scaption, 2# x10 ea  TB serratus wall slides Y TB 2x10  SL ER/ABD 2# 2x10 ea bilateral    Manual therapy: Patient received joint mobilization to bilateral shoulder distraction, oscillation, AP and inferior mobs grade II and grade III followed by passive range of motion.

## 2023-12-27 ENCOUNTER — TREATMENT (OUTPATIENT)
Dept: PHYSICAL THERAPY | Facility: CLINIC | Age: 68
End: 2023-12-27
Payer: MEDICARE

## 2023-12-27 DIAGNOSIS — M25.512 BILATERAL SHOULDER PAIN: Primary | ICD-10-CM

## 2023-12-27 DIAGNOSIS — M25.511 BILATERAL SHOULDER PAIN: Primary | ICD-10-CM

## 2023-12-27 DIAGNOSIS — M75.82 TENDINITIS OF BOTH ROTATOR CUFFS: ICD-10-CM

## 2023-12-27 DIAGNOSIS — M75.81 TENDINITIS OF BOTH ROTATOR CUFFS: ICD-10-CM

## 2023-12-27 PROCEDURE — 97110 THERAPEUTIC EXERCISES: CPT | Mod: GP | Performed by: PHYSICAL THERAPIST

## 2023-12-27 PROCEDURE — 97140 MANUAL THERAPY 1/> REGIONS: CPT | Mod: GP | Performed by: PHYSICAL THERAPIST

## 2023-12-27 NOTE — PROGRESS NOTES
"                                                                                                                                                                                                                                                   PHYSICAL THERAPY TREATMENT NOTE    Patient Name:  Aleks Boucher \"Abbe\"   Patient MRN: [id number]  Date: 12/27/23  Time Calculation  Start Time: 0945  Stop Time: 1030  Time Calculation (min): 45 min    Insurance:  Visit number: 7  Insurance Type:   MEDICARE MEDICARE PART A AND B       Authorization or Plan of Care date Range: active to 2/5/24    Therapy diagnoses:   1. Bilateral shoulder pain        2. Tendinitis of both rotator cuffs           General:  Referred by: Bienvenido Cotter MD     Precautions:  Defibrillator implanted 12/2008  Mitrav valve repair 12/2008  On blood thinner  H/o DVT  Fall Risk: Low  No fall since last visit    Outcome Measure:  20.00    Assessment:  Patient overall has improved ROM and functional reaching with less discomfort. Will continue with HEP to maintain progress.   Progress towards functional goals: Improved ability to sleep thru the night. Improved lifting ability. Improved reaching ability. Reduced frequency of pain. Reduced intensity of pain.    Plan:  Discharge to HEP    Goals:  STG 2 wks  Compliant with HEP-MET  Dec pain 25% in left shoulder-MET     LTG by discharge  I HEP-MET  2. Improve functional outcome score to 80-90% on QuickDash indicating improved functional level-MET  3. Patient B. shoulder range of motion within normal limits for patient to be able to reach, and perform personal care without any difficulty-MET  4. Dec pain 0-2/10 to be able to sleep through the night-MET  5.B. shoulder strength 5/5 without pain by discharge to be able to perform daily activities and recreational activities-MET  6. Patient returns to prior to injury functional level-MET    Subjective:   Patient reports pain has been no worse over the holidays. Feels " "progress overall   Pain (0-10): 1-2/10 currently   HEP adherence / understanding: good    Objective:  AROM with elevation is 160 deg bilateral, 110 deg painful arc R side  Strength is 4+/5 throughout     Treatment Performed: (\"NP\" = Not Performed)   UBE x 6 min 3 forward and 3 reverse  Pulleys x 20 ea  TB row/ext black 2x10 ea  TB Green Bilateral shoulder external rotation with elbow bent 10 x 2-NP  TB green Bilateral shoulder abduction with green theraband elbow straight x 10 x 2-NP  TB ER/IR Blue 2x10 ea bilateral  Standing shoulder flexion, abduction, scaption, 2# x10 ea  TB serratus wall slides Y TB 2x10  SL ER/ABD 2# 2x10 ea bilateral    Manual therapy: Patient received joint mobilization to bilateral shoulder distraction, oscillation, AP and inferior mobs grade II and grade III followed by passive range of motion.      "

## 2024-01-10 ENCOUNTER — HOSPITAL ENCOUNTER (OUTPATIENT)
Dept: CARDIOLOGY | Facility: CLINIC | Age: 69
Discharge: HOME | End: 2024-01-10
Payer: MEDICARE

## 2024-01-10 DIAGNOSIS — I49.01 VENTRICULAR FIBRILLATION (MULTI): ICD-10-CM

## 2024-01-10 DIAGNOSIS — Z95.810 IMPLANTABLE CARDIOVERTER-DEFIBRILLATOR (ICD) IN SITU: ICD-10-CM

## 2024-01-10 PROCEDURE — 93295 DEV INTERROG REMOTE 1/2/MLT: CPT | Performed by: INTERNAL MEDICINE

## 2024-01-10 PROCEDURE — 93296 REM INTERROG EVL PM/IDS: CPT

## 2024-01-31 ENCOUNTER — OFFICE VISIT (OUTPATIENT)
Dept: PRIMARY CARE | Facility: CLINIC | Age: 69
End: 2024-01-31
Payer: MEDICARE

## 2024-01-31 VITALS
WEIGHT: 294 LBS | SYSTOLIC BLOOD PRESSURE: 135 MMHG | BODY MASS INDEX: 37.73 KG/M2 | HEIGHT: 74 IN | DIASTOLIC BLOOD PRESSURE: 87 MMHG

## 2024-01-31 DIAGNOSIS — Z00.00 HEALTH CARE MAINTENANCE: ICD-10-CM

## 2024-01-31 DIAGNOSIS — J30.2 SEASONAL ALLERGIES: Primary | ICD-10-CM

## 2024-01-31 DIAGNOSIS — E11.69 TYPE 2 DIABETES MELLITUS WITH OTHER SPECIFIED COMPLICATION, WITHOUT LONG-TERM CURRENT USE OF INSULIN (MULTI): ICD-10-CM

## 2024-01-31 DIAGNOSIS — G62.9 NEUROPATHY: ICD-10-CM

## 2024-01-31 DIAGNOSIS — E78.5 HYPERLIPIDEMIA, UNSPECIFIED HYPERLIPIDEMIA TYPE: ICD-10-CM

## 2024-01-31 DIAGNOSIS — E03.9 HYPOTHYROIDISM, UNSPECIFIED TYPE: ICD-10-CM

## 2024-01-31 PROCEDURE — 1126F AMNT PAIN NOTED NONE PRSNT: CPT | Performed by: STUDENT IN AN ORGANIZED HEALTH CARE EDUCATION/TRAINING PROGRAM

## 2024-01-31 PROCEDURE — 1036F TOBACCO NON-USER: CPT | Performed by: STUDENT IN AN ORGANIZED HEALTH CARE EDUCATION/TRAINING PROGRAM

## 2024-01-31 PROCEDURE — 3079F DIAST BP 80-89 MM HG: CPT | Performed by: STUDENT IN AN ORGANIZED HEALTH CARE EDUCATION/TRAINING PROGRAM

## 2024-01-31 PROCEDURE — 99213 OFFICE O/P EST LOW 20 MIN: CPT | Performed by: STUDENT IN AN ORGANIZED HEALTH CARE EDUCATION/TRAINING PROGRAM

## 2024-01-31 PROCEDURE — 3075F SYST BP GE 130 - 139MM HG: CPT | Performed by: STUDENT IN AN ORGANIZED HEALTH CARE EDUCATION/TRAINING PROGRAM

## 2024-01-31 NOTE — PROGRESS NOTES
"Subjective   Patient ID: Abbe Boucher is a 68 y.o. male who presents for Follow-up (Cough, x 1 month/Fell 1 month ago, still has rt. Hip discomfort).    HPI   Routine fu.    He complains of chronic post-nasal drip, seems to be worse in winter, thinks this is due to allergies.    He otherwise feels well.  Review of Systems  12-point ROS reviewed and was negative unless otherwise noted in HPI.    Objective   Ht 1.88 m (6' 2\")   Wt 133 kg (294 lb)   BMI 37.75 kg/m²     Physical Exam  GEN: conversant, NAD  HEENT: PERRL, EOMI, MMM  NECK: supple, no carotid bruits appreciated b/l  CV: S1, S2, RRR  PULM: CTAB  ABD: soft, NT, ND  NEURO: no new gross focal deficits  EXT: no sig LE edema  PSYCH: appropriate affect    Assessment/Plan     #H/o VT arrest s/p ICD  Follows with EP      #Permanent A. fib  Well-controlled  Following with cardiology     #Hyperlipidemia  Continue atorvastatin 10mg daily     #Diabetes, type 2  Continue Ozempic dose 0.5mg, discussed healthy lifestyle habits.   Seeing endocrinology  - The patient has been seen for a face-to-face evaluation. The patient is diabetic. I am recommending the patient for diabetic shoes/inserts based on qualifying conditions listed on cmn.     #Hypothyroidism  Continue levothyroxine     #BPH - continue tamsulosin    #Neuropathy: chronic, continue gabapentin     #Health maintenance: he says that he had a colonoscopy in 2022, no polyps. Advised Shingrix, RSV vaccine, and yearly flu shot. Received COVID-19 vaccines. Received Pneumovax 23. He declines TDaP.     RTC 4 months      "

## 2024-02-14 ENCOUNTER — TELEMEDICINE (OUTPATIENT)
Dept: PRIMARY CARE | Facility: CLINIC | Age: 69
End: 2024-02-14
Payer: MEDICARE

## 2024-02-14 DIAGNOSIS — U07.1 COVID: Primary | ICD-10-CM

## 2024-02-14 PROCEDURE — 99213 OFFICE O/P EST LOW 20 MIN: CPT | Performed by: STUDENT IN AN ORGANIZED HEALTH CARE EDUCATION/TRAINING PROGRAM

## 2024-02-14 PROCEDURE — 1159F MED LIST DOCD IN RCRD: CPT | Performed by: STUDENT IN AN ORGANIZED HEALTH CARE EDUCATION/TRAINING PROGRAM

## 2024-02-14 PROCEDURE — 1126F AMNT PAIN NOTED NONE PRSNT: CPT | Performed by: STUDENT IN AN ORGANIZED HEALTH CARE EDUCATION/TRAINING PROGRAM

## 2024-02-14 PROCEDURE — 1036F TOBACCO NON-USER: CPT | Performed by: STUDENT IN AN ORGANIZED HEALTH CARE EDUCATION/TRAINING PROGRAM

## 2024-02-14 NOTE — PROGRESS NOTES
"Subjective   Patient ID: Aleks Boucher \"Abbe\" is a 68 y.o. male who presents for No chief complaint on file..  HPI  Abbe is here today for a virtual sick visit.    Symptoms started 2/10 with body aches, nasal congestion, sore throat, rhinorrhea and cough productive of green sputum. He tested positive for COVID on 2/12 upon presentation to an urgent care center, where he was prescribed a medrol dose pack, doxycyline, benzonatate 200mg. He was instructed to follow up with his PCP. He reports that since starting the medications that he was given by urgent care, his symptoms have been steadily improving. Cough has improved significantly and energy is better today. No SOB, CP, NVD or any other complaints at this time.    Review of Systems  12-point ROS was reviewed and is negative, unless otherwise noted in HPI    Objective   There were no vitals filed for this visit.   Physical Exam  GEN: alert, conversant, NAD    Limited due to virtual visit    Assessment/Plan   #COVID positive  #URI with cough and congestion  Discussed possibility of starting paxlovid, but given symptom improvement in the last 2 days patient declines.  - Advised to complete medications prescribed by urgent care  - Counseled on isolation and masking recommendation per CDC guidelines  - advised to stay well hydrated, resting regularly, continue supportive care  - Advised to call for worsening symptoms in the next 3-4 days, for any fevers/chills, or significant SOB/sputum production     I spent 21 minutes reviewing chart, visiting with patient, writing prescriptions and documenting in the chart.    RTC as scheduled, sooner PRN      Abran Ortiz DO  "

## 2024-02-16 ENCOUNTER — TELEPHONE (OUTPATIENT)
Dept: PRIMARY CARE | Facility: CLINIC | Age: 69
End: 2024-02-16

## 2024-02-22 DIAGNOSIS — B96.89 ACUTE BACTERIAL SINUSITIS: Primary | ICD-10-CM

## 2024-02-22 DIAGNOSIS — J01.90 ACUTE BACTERIAL SINUSITIS: Primary | ICD-10-CM

## 2024-02-22 RX ORDER — DOXYCYCLINE 100 MG/1
100 CAPSULE ORAL 2 TIMES DAILY
Qty: 10 CAPSULE | Refills: 0 | Status: SHIPPED | OUTPATIENT
Start: 2024-02-22 | End: 2024-02-27

## 2024-02-22 NOTE — PROGRESS NOTES
Progressively worsening sinus congestion, nasal discharge for ~1.5 weeks since recovery from recent COVID infection. Treat for acute bacterial sinusitis.

## 2024-02-26 ENCOUNTER — TELEPHONE (OUTPATIENT)
Dept: CARDIOLOGY | Facility: CLINIC | Age: 69
End: 2024-02-26
Payer: MEDICARE

## 2024-02-26 NOTE — TELEPHONE ENCOUNTER
Patient is requesting assistance in applying for the [employer] program to help cover cost of Xarelto. He stated he had assistance last year.

## 2024-02-26 NOTE — TELEPHONE ENCOUNTER
Spoke with patient and provided with [employer] phone number. Advised to call number and if cont'd issues/concerns can place referral to  specialty pharmacy for financial assist. Understanding verb.  Samples provided- 4 bottles LOT#[id number] EXP 10/2025.

## 2024-02-28 ENCOUNTER — OFFICE VISIT (OUTPATIENT)
Dept: PRIMARY CARE | Facility: CLINIC | Age: 69
End: 2024-02-28
Payer: MEDICARE

## 2024-02-28 VITALS
BODY MASS INDEX: 36.83 KG/M2 | TEMPERATURE: 97.9 F | HEIGHT: 74 IN | SYSTOLIC BLOOD PRESSURE: 147 MMHG | WEIGHT: 287 LBS | DIASTOLIC BLOOD PRESSURE: 94 MMHG

## 2024-02-28 DIAGNOSIS — J06.9 VIRAL URI WITH COUGH: Primary | ICD-10-CM

## 2024-02-28 PROCEDURE — 99213 OFFICE O/P EST LOW 20 MIN: CPT | Performed by: STUDENT IN AN ORGANIZED HEALTH CARE EDUCATION/TRAINING PROGRAM

## 2024-02-28 PROCEDURE — 3080F DIAST BP >= 90 MM HG: CPT | Performed by: STUDENT IN AN ORGANIZED HEALTH CARE EDUCATION/TRAINING PROGRAM

## 2024-02-28 PROCEDURE — 1036F TOBACCO NON-USER: CPT | Performed by: STUDENT IN AN ORGANIZED HEALTH CARE EDUCATION/TRAINING PROGRAM

## 2024-02-28 PROCEDURE — 3077F SYST BP >= 140 MM HG: CPT | Performed by: STUDENT IN AN ORGANIZED HEALTH CARE EDUCATION/TRAINING PROGRAM

## 2024-02-28 PROCEDURE — 1160F RVW MEDS BY RX/DR IN RCRD: CPT | Performed by: STUDENT IN AN ORGANIZED HEALTH CARE EDUCATION/TRAINING PROGRAM

## 2024-02-28 PROCEDURE — 1159F MED LIST DOCD IN RCRD: CPT | Performed by: STUDENT IN AN ORGANIZED HEALTH CARE EDUCATION/TRAINING PROGRAM

## 2024-02-28 PROCEDURE — 1126F AMNT PAIN NOTED NONE PRSNT: CPT | Performed by: STUDENT IN AN ORGANIZED HEALTH CARE EDUCATION/TRAINING PROGRAM

## 2024-02-28 RX ORDER — BENZONATATE 200 MG/1
200 CAPSULE ORAL NIGHTLY PRN
Qty: 30 CAPSULE | Refills: 0 | Status: SHIPPED | OUTPATIENT
Start: 2024-02-28 | End: 2024-03-29

## 2024-02-28 NOTE — PROGRESS NOTES
"Subjective   Patient ID: Aleks Boucher \"Abbe\" is a 68 y.o. male who presents for Cough (Deep cough; congestion ), Sinusitis (Sinus pressure all over ), and Shortness of Breath.  HPI  Abbe is here today for a sick visit.    Reports some persistent nasal congestion and mostly dry cough. He tested positive for COVID on 2/12. Completed a medrol dose pack and then completed doxycyline course yesterday. Cough has improved significantly and energy is better today. He is using nasal saline and claritin. He has been drinking plenty of water. No other complaint at this time. He thinks his symptoms have been steadily improving over the last 3 days. No SOB, CP, NVD or any other complaints at this time.    Review of Systems  12-point ROS was reviewed and is negative, unless otherwise noted in HPI    Objective   Vitals:    02/28/24 0904   BP: (!) 147/94   Temp: 36.6 °C (97.9 °F)      Physical Exam  GEN: conversant, NAD  HEENT: PERRL, EOMI, Tms pearly gray bilateral - not bulging, erythematous or retracted, no posterior pharyngeal erythema  NECK: supple, full, no carotid bruits appreciated bilaterally  CV: S1, S2, regular, no murmur  PULM: CTAB, no wheezing or focal diminished breath sounds  ABD: soft, NT, obese  EXT: no LE edema  NEURO: no gross focal deficits  PSYCH: appropriate affect     Assessment/Plan   #recent COVID positive (2/12)  #URI with cough and congestion  Completed course of antibiotics for secondary bacterial sinusitis  Plan:  - advised to stay well hydrated, resting regularly, continue supportive care  - refilled tessalon to use nightly PRN continued cough    RTC as scheduled, sooner PRN      Abran Ortiz DO  "

## 2024-03-07 DIAGNOSIS — I48.11 LONGSTANDING PERSISTENT ATRIAL FIBRILLATION (MULTI): ICD-10-CM

## 2024-03-21 DIAGNOSIS — G62.9 NEUROPATHY: ICD-10-CM

## 2024-03-21 DIAGNOSIS — E03.9 HYPOTHYROIDISM, UNSPECIFIED TYPE: ICD-10-CM

## 2024-03-21 DIAGNOSIS — E78.5 HYPERLIPIDEMIA, UNSPECIFIED HYPERLIPIDEMIA TYPE: ICD-10-CM

## 2024-03-21 RX ORDER — LEVOTHYROXINE SODIUM 50 UG/1
50 TABLET ORAL DAILY
Qty: 90 TABLET | Refills: 0 | Status: SHIPPED | OUTPATIENT
Start: 2024-03-21 | End: 2024-09-17

## 2024-03-21 RX ORDER — ATORVASTATIN CALCIUM 10 MG/1
10 TABLET, FILM COATED ORAL DAILY
Qty: 90 TABLET | Refills: 0 | Status: SHIPPED | OUTPATIENT
Start: 2024-03-21

## 2024-03-21 RX ORDER — GABAPENTIN 300 MG/1
300 CAPSULE ORAL 3 TIMES DAILY
Qty: 270 CAPSULE | Refills: 0 | Status: SHIPPED | OUTPATIENT
Start: 2024-03-21 | End: 2024-09-17

## 2024-04-09 ENCOUNTER — HOSPITAL ENCOUNTER (OUTPATIENT)
Dept: CARDIOLOGY | Facility: CLINIC | Age: 69
Discharge: HOME | End: 2024-04-09
Payer: MEDICARE

## 2024-04-09 DIAGNOSIS — I49.01 VENTRICULAR FIBRILLATION (MULTI): ICD-10-CM

## 2024-04-09 DIAGNOSIS — Z95.810 PRESENCE OF AUTOMATIC CARDIOVERTER/DEFIBRILLATOR (AICD): ICD-10-CM

## 2024-04-16 ENCOUNTER — OFFICE VISIT (OUTPATIENT)
Dept: PRIMARY CARE | Facility: CLINIC | Age: 69
End: 2024-04-16
Payer: MEDICARE

## 2024-04-16 ENCOUNTER — HOSPITAL ENCOUNTER (OUTPATIENT)
Dept: RADIOLOGY | Facility: HOSPITAL | Age: 69
Discharge: HOME | End: 2024-04-16
Payer: MEDICARE

## 2024-04-16 VITALS — SYSTOLIC BLOOD PRESSURE: 115 MMHG | WEIGHT: 292 LBS | DIASTOLIC BLOOD PRESSURE: 69 MMHG | BODY MASS INDEX: 37.49 KG/M2

## 2024-04-16 DIAGNOSIS — R91.1 LUNG NODULE: ICD-10-CM

## 2024-04-16 DIAGNOSIS — M25.569 ACUTE KNEE PAIN, UNSPECIFIED LATERALITY: Primary | ICD-10-CM

## 2024-04-16 DIAGNOSIS — J45.909 ASTHMA, UNSPECIFIED ASTHMA SEVERITY, UNSPECIFIED WHETHER COMPLICATED, UNSPECIFIED WHETHER PERSISTENT (HHS-HCC): ICD-10-CM

## 2024-04-16 PROCEDURE — 99214 OFFICE O/P EST MOD 30 MIN: CPT | Performed by: STUDENT IN AN ORGANIZED HEALTH CARE EDUCATION/TRAINING PROGRAM

## 2024-04-16 PROCEDURE — 71250 CT THORAX DX C-: CPT | Performed by: RADIOLOGY

## 2024-04-16 PROCEDURE — 3078F DIAST BP <80 MM HG: CPT | Performed by: STUDENT IN AN ORGANIZED HEALTH CARE EDUCATION/TRAINING PROGRAM

## 2024-04-16 PROCEDURE — 1160F RVW MEDS BY RX/DR IN RCRD: CPT | Performed by: STUDENT IN AN ORGANIZED HEALTH CARE EDUCATION/TRAINING PROGRAM

## 2024-04-16 PROCEDURE — 3074F SYST BP LT 130 MM HG: CPT | Performed by: STUDENT IN AN ORGANIZED HEALTH CARE EDUCATION/TRAINING PROGRAM

## 2024-04-16 PROCEDURE — 1159F MED LIST DOCD IN RCRD: CPT | Performed by: STUDENT IN AN ORGANIZED HEALTH CARE EDUCATION/TRAINING PROGRAM

## 2024-04-16 PROCEDURE — 1036F TOBACCO NON-USER: CPT | Performed by: STUDENT IN AN ORGANIZED HEALTH CARE EDUCATION/TRAINING PROGRAM

## 2024-04-16 PROCEDURE — 71250 CT THORAX DX C-: CPT

## 2024-04-16 RX ORDER — BUDESONIDE AND FORMOTEROL FUMARATE DIHYDRATE 80; 4.5 UG/1; UG/1
2 AEROSOL RESPIRATORY (INHALATION) 2 TIMES DAILY
Qty: 10.2 G | Refills: 1 | Status: SHIPPED | OUTPATIENT
Start: 2024-04-16

## 2024-04-16 NOTE — PROGRESS NOTES
"Subjective   Patient ID: Aleks Boucher \"Abbe\" is a 69 y.o. male who presents for left knee pain (2x weeks stepped off a curb wrong), Med Refill, and still has coughing (Had covid last night).    HPI  SICK VISIT    -prev surg in 70s on left knee. 3 wks ago stepped off curb and felt \"twinge\" in knee. Felt sore there after and has been getting worse and has been getting weaker with bearing weight.  -COVID 2mo ago. Has had morning cough since and sometimes has difficulty catching breath with laughing fits. Previous docs in [place] suggested routine scans due to nodules    Review of Systems  A 10 point ROS was performed with the patient denying any complaint at this time aside from those listed in the HPI above.     Objective   Visit Vitals  /69   Wt 132 kg (292 lb)   BMI 37.49 kg/m²   Smoking Status Never   BSA 2.63 m²      Physical Exam  GEN: conversant, NAD  HEENT: PERRL, EOMI  CV: S1, S2, RRR  PULM: CTAB  ABD: soft, NT, ND  NEURO: no new gross focal deficits  EXT: no sig LE edema. Discomfort with valgus test on left know  PSYCH: appropriate affect    Assessment/Plan   #Left knee pain: (+) valgus, previous unspecified surgery in 1970s. Referral to ortho. Suggested trial of NSAID and knee brace.    #1cm Lung Nodule: never smoker. Will reorder repeat CT chest for surveillance due last imaging being in 2022 in [place], records now available in [facility]. Continue advair for post-COVID SoB, possible asthma.    ======CHRONIC ISSUES PER PRIOR NOTE======    #H/o VT arrest s/p ICD  Follows with EP      #Permanent A. fib  Well-controlled  Following with cardiology     #Hyperlipidemia  Continue atorvastatin 10mg daily     #Diabetes, type 2: Continue Ozempic 0.5mg, discussed healthy lifestyle habits.  Seeing endocrinology. Recommended diabetic shoes/inserts     #Hypothyroidism: Continue levothyroxine     #BPH - continue tamsulosin     #Neuropathy: chronic, continue gabapentin     #Health maintenance: he says that he had a " colonoscopy in 2022, no polyps. CT Chest 2022 with 1cm nodule, ordering repeat. Advised Shingrix, RSV vaccine, and yearly flu shot. Received COVID-19 vaccines. Received Pneumovax 23. He declines TDaP.     RTC 4 months     Saw Aleman DO  PGY-1, Internal Medicine    Trainee role: Resident    I saw and evaluated the patient. I personally obtained the key and critical portions of the history and physical exam or was physically present for key and critical portions performed by the trainee. I reviewed the trainee's documentation and discussed the patient with the trainee. I agree with the trainee's medical decision making as documented on the trainee's notes.    Aleks Sarkar M.D.

## 2024-04-17 DIAGNOSIS — I48.11 LONGSTANDING PERSISTENT ATRIAL FIBRILLATION (MULTI): ICD-10-CM

## 2024-04-18 NOTE — PROGRESS NOTES
CAD on CT chest, will advise increasing statin. Plan to order liver US for liver lesion. 3 month fu repeat CT chest advised for 7/2024, will discuss at upcoming appointment in June.

## 2024-04-19 ENCOUNTER — TELEPHONE (OUTPATIENT)
Dept: PRIMARY CARE | Facility: CLINIC | Age: 69
End: 2024-04-19
Payer: MEDICARE

## 2024-04-19 DIAGNOSIS — J45.909 ASTHMA, UNSPECIFIED ASTHMA SEVERITY, UNSPECIFIED WHETHER COMPLICATED, UNSPECIFIED WHETHER PERSISTENT (HHS-HCC): Primary | ICD-10-CM

## 2024-04-19 DIAGNOSIS — N40.0 BENIGN PROSTATIC HYPERPLASIA, UNSPECIFIED WHETHER LOWER URINARY TRACT SYMPTOMS PRESENT: ICD-10-CM

## 2024-04-19 DIAGNOSIS — J45.909 ASTHMA, UNSPECIFIED ASTHMA SEVERITY, UNSPECIFIED WHETHER COMPLICATED, UNSPECIFIED WHETHER PERSISTENT (HHS-HCC): ICD-10-CM

## 2024-04-19 RX ORDER — FLUTICASONE PROPIONATE AND SALMETEROL 250; 50 UG/1; UG/1
1 POWDER RESPIRATORY (INHALATION)
Qty: 60 EACH | Refills: 3 | Status: SHIPPED | OUTPATIENT
Start: 2024-04-19 | End: 2024-08-17

## 2024-04-22 ENCOUNTER — LAB (OUTPATIENT)
Dept: LAB | Facility: LAB | Age: 69
End: 2024-04-22
Payer: MEDICARE

## 2024-04-22 DIAGNOSIS — E11.9 TYPE 2 DIABETES MELLITUS WITHOUT COMPLICATIONS (MULTI): Primary | ICD-10-CM

## 2024-04-22 DIAGNOSIS — E78.5 HYPERLIPIDEMIA, UNSPECIFIED: ICD-10-CM

## 2024-04-22 DIAGNOSIS — E55.9 VITAMIN D DEFICIENCY, UNSPECIFIED: ICD-10-CM

## 2024-04-22 LAB
25(OH)D3 SERPL-MCNC: 42 NG/ML (ref 30–100)
ALBUMIN SERPL BCP-MCNC: 4 G/DL (ref 3.4–5)
ALP SERPL-CCNC: 54 U/L (ref 33–136)
ALT SERPL W P-5'-P-CCNC: 35 U/L (ref 10–52)
ANION GAP SERPL CALC-SCNC: 15 MMOL/L (ref 10–20)
AST SERPL W P-5'-P-CCNC: 36 U/L (ref 9–39)
BILIRUB SERPL-MCNC: 1 MG/DL (ref 0–1.2)
BUN SERPL-MCNC: 20 MG/DL (ref 6–23)
CALCIUM SERPL-MCNC: 9.5 MG/DL (ref 8.6–10.6)
CHLORIDE SERPL-SCNC: 104 MMOL/L (ref 98–107)
CO2 SERPL-SCNC: 25 MMOL/L (ref 21–32)
CREAT SERPL-MCNC: 1.11 MG/DL (ref 0.5–1.3)
EGFRCR SERPLBLD CKD-EPI 2021: 72 ML/MIN/1.73M*2
EST. AVERAGE GLUCOSE BLD GHB EST-MCNC: 126 MG/DL
GLUCOSE SERPL-MCNC: 104 MG/DL (ref 74–99)
HBA1C MFR BLD: 6 %
POTASSIUM SERPL-SCNC: 4.8 MMOL/L (ref 3.5–5.3)
PROT SERPL-MCNC: 7 G/DL (ref 6.4–8.2)
SODIUM SERPL-SCNC: 139 MMOL/L (ref 136–145)

## 2024-04-22 PROCEDURE — 82306 VITAMIN D 25 HYDROXY: CPT

## 2024-04-22 PROCEDURE — 83036 HEMOGLOBIN GLYCOSYLATED A1C: CPT

## 2024-04-22 PROCEDURE — 80053 COMPREHEN METABOLIC PANEL: CPT

## 2024-04-22 PROCEDURE — 36415 COLL VENOUS BLD VENIPUNCTURE: CPT

## 2024-04-24 DIAGNOSIS — I48.11 LONGSTANDING PERSISTENT ATRIAL FIBRILLATION (MULTI): ICD-10-CM

## 2024-04-25 ENCOUNTER — HOSPITAL ENCOUNTER (OUTPATIENT)
Dept: RADIOLOGY | Facility: HOSPITAL | Age: 69
Discharge: HOME | End: 2024-04-25
Payer: MEDICARE

## 2024-04-25 DIAGNOSIS — M25.562 ACUTE PAIN OF LEFT KNEE: ICD-10-CM

## 2024-04-25 PROCEDURE — 73562 X-RAY EXAM OF KNEE 3: CPT | Mod: LT

## 2024-04-25 PROCEDURE — 73562 X-RAY EXAM OF KNEE 3: CPT | Mod: LEFT SIDE | Performed by: RADIOLOGY

## 2024-04-26 ENCOUNTER — OFFICE VISIT (OUTPATIENT)
Dept: ORTHOPEDIC SURGERY | Facility: CLINIC | Age: 69
End: 2024-04-26
Payer: MEDICARE

## 2024-04-26 VITALS — WEIGHT: 286 LBS | BODY MASS INDEX: 36.7 KG/M2 | HEIGHT: 74 IN

## 2024-04-26 DIAGNOSIS — M25.562 ACUTE PAIN OF LEFT KNEE: ICD-10-CM

## 2024-04-26 DIAGNOSIS — M17.12 ARTHRITIS OF KNEE, LEFT: Primary | ICD-10-CM

## 2024-04-26 DIAGNOSIS — M25.511 CHRONIC RIGHT SHOULDER PAIN: ICD-10-CM

## 2024-04-26 DIAGNOSIS — G89.29 CHRONIC RIGHT SHOULDER PAIN: ICD-10-CM

## 2024-04-26 DIAGNOSIS — M75.81 ROTATOR CUFF TENDINITIS, RIGHT: ICD-10-CM

## 2024-04-26 PROCEDURE — 20610 DRAIN/INJ JOINT/BURSA W/O US: CPT | Performed by: ORTHOPAEDIC SURGERY

## 2024-04-26 PROCEDURE — 1036F TOBACCO NON-USER: CPT | Performed by: ORTHOPAEDIC SURGERY

## 2024-04-26 PROCEDURE — 1159F MED LIST DOCD IN RCRD: CPT | Performed by: ORTHOPAEDIC SURGERY

## 2024-04-26 PROCEDURE — 1160F RVW MEDS BY RX/DR IN RCRD: CPT | Performed by: ORTHOPAEDIC SURGERY

## 2024-04-26 PROCEDURE — 99214 OFFICE O/P EST MOD 30 MIN: CPT | Performed by: ORTHOPAEDIC SURGERY

## 2024-04-26 PROCEDURE — 3044F HG A1C LEVEL LT 7.0%: CPT | Performed by: ORTHOPAEDIC SURGERY

## 2024-04-26 RX ORDER — TRIAMCINOLONE ACETONIDE 40 MG/ML
40 INJECTION, SUSPENSION INTRA-ARTICULAR; INTRAMUSCULAR
Status: COMPLETED | OUTPATIENT
Start: 2024-04-26 | End: 2024-04-26

## 2024-04-26 RX ORDER — LIDOCAINE HYDROCHLORIDE 10 MG/ML
2 INJECTION INFILTRATION; PERINEURAL
Status: COMPLETED | OUTPATIENT
Start: 2024-04-26 | End: 2024-04-26

## 2024-04-26 RX ADMIN — LIDOCAINE HYDROCHLORIDE 2 ML: 10 INJECTION INFILTRATION; PERINEURAL at 13:16

## 2024-04-26 RX ADMIN — TRIAMCINOLONE ACETONIDE 40 MG: 40 INJECTION, SUSPENSION INTRA-ARTICULAR; INTRAMUSCULAR at 13:16

## 2024-04-26 NOTE — PROGRESS NOTES
69-year-old is seen for his new problem of left knee pain and follow-up for his right shoulder.  He has been having persistent severe sharp shooting pain in the left knee for 1 month.  Pain is worse with standing and walking and going up and down stairs and getting up and down from a chair in and out of a car.  He has used a brace and a knee sleeve.  He has taken Tylenol and applied ice.  He is on Xarelto and is unable to use NSAIDs.  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  He has difficulty sleeping.  The right shoulder has persisted with pain despite conservative treatment including physical therapy and avoidance of aggravating activities.    Pleasant and no acute distress. Walks with antalgic gait. Stands with varus alignment of the left knee and neutral on the right. Left knee range of motion is 5-110°. The knee is stable to varus and valgus stress Lachman and posterior drawer. There is a mild effusion. There is generalized tenderness. Right knee range of motion is 0-120°. There is no effusion. The knee is stable to varus and valgus stress Lachman and posterior drawer. Both lower extremities are well perfused the skin is intact and muscle tone is adequate.    Pleasant and no acute distress.  Right shoulder forward flexion 160°. No effusion or instability. There is positive Neer and [place] impingement. There is subacromial crepitus and tenderness around the subacromial space. No biceps tenderness. No acromioclavicular tenderness. Rotator cuff strength is intact but there is discomfort with strength testing. Left shoulder forward flexion 180°. No effusion or instability. No impingement or crepitus or tenderness involving the subacromial space. No biceps or acromioclavicular tenderness. There is intact rotator cuff strength. There is adequate range of motion of the cervical spine without pain. Both upper extremities are well perfused, skin is intact and muscle tone is adequate. Elbow flexion and  extension and wrist flexion and extension strength are intact.    Multiple x-ray views of the left knee which are nonweightbearing demonstrate mild medial compartment narrowing.    A discussion about knee arthritis was done.  Treatment options were reviewed and the decision was made to proceed with cortisone injection.  He will perform physical therapy.  He will ice or heat and use Tylenol.  If he is having persistent symptoms then he would have weightbearing x-rays taken.  In regard to the shoulder treatment options were reviewed and the decision was made to proceed with cortisone injection.  Will continue with the exercise program for the shoulder.  If he has persistent symptoms MRI could be obtained.    L Inj/Asp: L knee on 4/26/2024 1:16 PM  Indications: pain  Details: 22 G needle, superolateral approach  Medications: 40 mg triamcinolone acetonide 40 mg/mL; 2 mL lidocaine 10 mg/mL (1 %)  Procedure, treatment alternatives, risks and benefits explained, specific risks discussed. Consent was given by the patient.       L Inj/Asp: R subacromial bursa on 4/26/2024 1:16 PM  Indications: pain  Details: 22 G needle, posterior approach  Medications: 40 mg triamcinolone acetonide 40 mg/mL; 2 mL lidocaine 10 mg/mL (1 %)  Procedure, treatment alternatives, risks and benefits explained, specific risks discussed. Consent was given by the patient.

## 2024-05-02 RX ORDER — TAMSULOSIN HYDROCHLORIDE 0.4 MG/1
0.4 CAPSULE ORAL NIGHTLY
Qty: 90 CAPSULE | Refills: 0 | Status: SHIPPED | OUTPATIENT
Start: 2024-05-02

## 2024-05-14 ASSESSMENT — DERMATOLOGY QUALITY OF LIFE (QOL) ASSESSMENT
RATE HOW EMOTIONALLY BOTHERED YOU ARE BY YOUR SKIN PROBLEM (FOR EXAMPLE, WORRY, EMBARRASSMENT, FRUSTRATION): 0 - NEVER BOTHERED
RATE HOW BOTHERED YOU ARE BY EFFECTS OF YOUR SKIN PROBLEMS ON YOUR ACTIVITIES (EG, GOING OUT, ACCOMPLISHING WHAT YOU WANT, WORK ACTIVITIES OR YOUR RELATIONSHIPS WITH OTHERS): 0 - NEVER BOTHERED
RATE HOW EMOTIONALLY BOTHERED YOU ARE BY YOUR SKIN PROBLEM (FOR EXAMPLE, WORRY, EMBARRASSMENT, FRUSTRATION): 0 - NEVER BOTHERED
RATE HOW BOTHERED YOU ARE BY SYMPTOMS OF YOUR SKIN PROBLEM (EG, ITCHING, STINGING BURNING, HURTING OR SKIN IRRITATION): 0 - NEVER BOTHERED
RATE HOW BOTHERED YOU ARE BY SYMPTOMS OF YOUR SKIN PROBLEM (EG, ITCHING, STINGING BURNING, HURTING OR SKIN IRRITATION): 0 - NEVER BOTHERED
WHAT SINGLE SKIN CONDITION LISTED BELOW IS THE PATIENT ANSWERING THE QUALITY-OF-LIFE ASSESSMENT QUESTIONS ABOUT: NONE OF THE ABOVE
WHAT SINGLE SKIN CONDITION LISTED BELOW IS THE PATIENT ANSWERING THE QUALITY-OF-LIFE ASSESSMENT QUESTIONS ABOUT: NONE OF THE ABOVE
RATE HOW BOTHERED YOU ARE BY EFFECTS OF YOUR SKIN PROBLEMS ON YOUR ACTIVITIES (EG, GOING OUT, ACCOMPLISHING WHAT YOU WANT, WORK ACTIVITIES OR YOUR RELATIONSHIPS WITH OTHERS): 0 - NEVER BOTHERED

## 2024-05-15 ENCOUNTER — OFFICE VISIT (OUTPATIENT)
Dept: DERMATOLOGY | Facility: CLINIC | Age: 69
End: 2024-05-15
Payer: MEDICARE

## 2024-05-15 DIAGNOSIS — L81.4 LENTIGO: ICD-10-CM

## 2024-05-15 DIAGNOSIS — D22.39 FIBROUS PAPULE OF NOSE: ICD-10-CM

## 2024-05-15 DIAGNOSIS — L91.0 KELOID: ICD-10-CM

## 2024-05-15 DIAGNOSIS — Z12.83 ENCOUNTER FOR SCREENING FOR MALIGNANT NEOPLASM OF SKIN: Primary | ICD-10-CM

## 2024-05-15 DIAGNOSIS — D23.9 DERMATOFIBROMA: ICD-10-CM

## 2024-05-15 DIAGNOSIS — L57.8 PHOTOAGING OF SKIN: ICD-10-CM

## 2024-05-15 DIAGNOSIS — D18.01 HEMANGIOMA OF SKIN: ICD-10-CM

## 2024-05-15 DIAGNOSIS — Z85.828 PERSONAL HISTORY OF OTHER MALIGNANT NEOPLASM OF SKIN: ICD-10-CM

## 2024-05-15 DIAGNOSIS — D22.5 MELANOCYTIC NEVI OF TRUNK: ICD-10-CM

## 2024-05-15 DIAGNOSIS — L91.8 SKIN TAG: ICD-10-CM

## 2024-05-15 PROCEDURE — 99213 OFFICE O/P EST LOW 20 MIN: CPT | Performed by: DERMATOLOGY

## 2024-05-15 PROCEDURE — 1036F TOBACCO NON-USER: CPT | Performed by: DERMATOLOGY

## 2024-05-15 PROCEDURE — 3044F HG A1C LEVEL LT 7.0%: CPT | Performed by: DERMATOLOGY

## 2024-05-15 PROCEDURE — 1159F MED LIST DOCD IN RCRD: CPT | Performed by: DERMATOLOGY

## 2024-05-15 PROCEDURE — 1160F RVW MEDS BY RX/DR IN RCRD: CPT | Performed by: DERMATOLOGY

## 2024-05-15 ASSESSMENT — DERMATOLOGY QUALITY OF LIFE (QOL) ASSESSMENT
RATE HOW BOTHERED YOU ARE BY EFFECTS OF YOUR SKIN PROBLEMS ON YOUR ACTIVITIES (EG, GOING OUT, ACCOMPLISHING WHAT YOU WANT, WORK ACTIVITIES OR YOUR RELATIONSHIPS WITH OTHERS): 0 - NEVER BOTHERED
DATE THE QUALITY-OF-LIFE ASSESSMENT WAS COMPLETED: 66975
WHAT SINGLE SKIN CONDITION LISTED BELOW IS THE PATIENT ANSWERING THE QUALITY-OF-LIFE ASSESSMENT QUESTIONS ABOUT: NONE OF THE ABOVE
ARE THERE EXCLUSIONS OR EXCEPTIONS FOR THE QUALITY OF LIFE ASSESSMENT: NO
RATE HOW EMOTIONALLY BOTHERED YOU ARE BY YOUR SKIN PROBLEM (FOR EXAMPLE, WORRY, EMBARRASSMENT, FRUSTRATION): 0 - NEVER BOTHERED
RATE HOW BOTHERED YOU ARE BY SYMPTOMS OF YOUR SKIN PROBLEM (EG, ITCHING, STINGING BURNING, HURTING OR SKIN IRRITATION): 0 - NEVER BOTHERED

## 2024-05-15 ASSESSMENT — PATIENT GLOBAL ASSESSMENT (PGA): PATIENT GLOBAL ASSESSMENT: PATIENT GLOBAL ASSESSMENT:  1 - CLEAR

## 2024-05-15 ASSESSMENT — DERMATOLOGY PATIENT ASSESSMENT
DO YOU HAVE ANY NEW OR CHANGING LESIONS: NO
FOR PATIENTS COMING IN FOR A FOLLOW-UP VISIT - HAVE THERE BEEN ANY CHANGES IN YOUR HEALTH SINCE YOUR LAST VISIT: NO
DO YOU USE A TANNING BED: NO
HAVE YOU HAD OR DO YOU HAVE A STAPH INFECTION: NO
HAVE YOU HAD OR DO YOU HAVE VASCULAR DISEASE: NO
DO YOU USE SUNSCREEN: OCCASIONALLY
ARE YOU AN ORGAN TRANSPLANT RECIPIENT: NO

## 2024-05-15 ASSESSMENT — ITCH NUMERIC RATING SCALE: HOW SEVERE IS YOUR ITCHING?: 0

## 2024-05-15 NOTE — PROGRESS NOTES
Subjective     Abbe Boucher is a 69 y.o. male who presents for the following: Skin Check (Aleks Boucher is a 69 y.o. male who presents for the following: Skin Check. Pt here for 6 month FBSE. Hx of BCC, Aks. Pt reports no areas of concern at this time. ).     Review of Systems:  No other skin or systemic complaints other than what is documented elsewhere in the note.    The following portions of the chart were reviewed this encounter and updated as appropriate:         Skin Cancer History  - Right shin - BCC s/p ED&C 2023    Specialty Problems          Dermatology Problems    Actinic keratosis    Basal cell carcinoma of skin of right lower limb, including hip    Hemangioma of skin and subcutaneous tissue    Neoplasm of uncertain behavior of skin    Other benign neoplasm of skin of other parts of face    Other seborrheic keratosis        Objective   Well appearing patient in no apparent distress; mood and affect are within normal limits.    A full examination was performed including scalp, head, eyes, ears, nose, lips, neck, chest, axillae, abdomen, back, buttocks, bilateral upper extremities, bilateral lower extremities, hands, feet, fingers, toes, fingernails, and toenails. All findings within normal limits unless otherwise noted below.    Assessment/Plan   1. Encounter for screening for malignant neoplasm of skin  No suspicious lesions noted on examination today     The risk of chronic, cumulative sun damage and risk of development of skin cancer was reviewed today.   The importance of sun protection was reviewed: including the use of a broad spectrum sunscreen that protects against both UVA/UVB rays, with ingredients such as Zinc oxide or titanium dioxide, wearing sun protective clothing and sun avoidance. We reviewed the warning signs of non-melanoma skin cancer and ABCDEs of melanoma  Please follow up should you notice any new or changing pre-existing skin lesion.    Related Procedures  Follow Up In  Dermatology - Established Patient    2. Personal history of other malignant neoplasm of skin  Right Lower Leg - Anterior  Well healed scar at site of prior treatment without evidence of recurrence.    There is no evidence of recurrence on clinical examination today, reassurance was provided to the patient. The importance of sun protection was reviewed with the patient including the use of a broad spectrum sunscreen that protects against both UVA/UVB rays, with ingredients such as Zinc oxide or titanium dioxide, wearing sun protective clothing and sun avoidance. Warning signs of non-melanoma skin cancer were reviewed. ABCDEs of melanoma reviewed. Patient to f/u should they notice any new or changing pre-existing skin lesion    Related Procedures  Follow Up In Dermatology - Established Patient    3. Dermatofibroma (3)  Left Forearm - Anterior, Left Lower Leg - Anterior, Right Thigh - Posterior  Firm pink papule with hyperpigmented halo, +dimple sign    Benign, scar tissue like growth that most frequently is due to bug bite or ingrown hair. No treatment is necessary.    4. Hemangioma of skin  Cherry red papules    The benign nature of these skin lesions were reviewed, no treatment is necessary.   Please follow up for any new or pre-existing lesion that is changing in size, shape, color, becomes painful, tender, itches or bleed.    5. Lentigo  Scattered tan macules in sun-exposed areas.    These are benign skin lesions due to sun exposure. They will darken in response to sun exposure. They should be monitored for change in size, shape or color.  These lesions can be treated cosmetically with topical creams, liquid nitrogen and a variety of lasers.    6. Melanocytic nevi of trunk  Tan-brown symmetric macules and papules    Clinically benign appearing nevi, no treatment is necessary.  The importance of sun protection was reviewed: including the use of a broad spectrum sunscreen that protects against both UVA/UVB rays,  with ingredients such as Zinc oxide or titanium dioxide, wearing sun protective clothing and sun avoidance.   ABCDEs of melanoma reviewed.  Please follow up should you notice any new or changing pre-existing skin lesion.    7. Photoaging of skin  Mottled pigmentation with telangiectasias and brown reticular macules in sun exposed areas of the body.     The risk of chronic, cumulative sun damage and risk of development of skin cancer was reviewed today.   The importance of sun protection was reviewed: including the use of a broad spectrum sunscreen that protects against both UVA/UVB rays, with ingredients such as Zinc oxide or titanium dioxide, wearing sun protective clothing and sun avoidance. We reviewed the warning signs of non-melanoma skin cancer and ABCDEs of melanoma  Please follow up should you notice any new or changing pre-existing skin lesion.    Related Procedures  Follow Up In Dermatology - Established Patient    8. Fibrous papule of nose  Mid Tip of Nose  Pink papule    The benign nature of these skin lesions were reviewed, no treatment is necessary.   Please follow up for any new or pre-existing lesion that is changing in size, shape, color, becomes painful, tender, itches or bleed.    9. Skin tag (2)  Left Axilla, Right Axilla  Fleshy, skin-colored sessile and pedunculated papules.     Benign growths that most commonly occur in areas of friction and rubbing, specifically the neck, axilla, and inguinal creases. No treatment is necessary. If lesions are symptomatic, they can be removed, however regardless of symptoms some insurances may not cover this procedure.    10. Keloid  Chest - Medial (Center)  Shiny, thick, fibrotic pink-brown plaque.    Keloids are thick, raised scars that often occur secondary to trauma, surgery, piercings and at times may occur spontaneously.  Treatments are limited, however often do respond to intralesional kenalog(ILK). Keloids often require multiple intralesional kenalog  "treatments  Occasionally keloids can \"removed\" surgically, however they have a risk of recurrence and still often require intralesional kenalog after removal to prevent recurrence.     Patient will return for follow up and let us know if he wants this treated with ILK.        Follow up in 1 year or sooner as needed    Sharon Guo MD   PGY3, Department of Dermatology    I saw and evaluated the patient. I personally obtained the key and critical portions of the history and physical exam or was physically present for key and critical portions performed by the resident/fellow. I reviewed the resident/fellow's documentation and discussed the patient with the resident/fellow. I agree with the resident/fellow's medical decision making as documented in the note.    Elena Dodge, DO      "

## 2024-05-16 NOTE — PROGRESS NOTES
Physical Therapy    Physical Therapy Evaluation    Patient Name: Aleks Boucher  MRN: [id number]  Today's Date: 5/17/2024  Time Calculation  Start Time: 1010  Stop Time: 1035  Time Calculation (min): 25 min     Problem List Items Addressed This Visit             ICD-10-CM    Left knee pain - Primary M25.562       Insurance:  Visit number: 1 of MN  Insurance Type: Payor: MEDICARE / Plan: MEDICARE PART A AND B / Product Type: *No Product type* /   Authorization or Plan of Care date Range:    General:  Reason for visit: Left knee pain - Mild arthritic changes left knee with some chondrocalcinosis   suggesting a component of pseudogout.     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  Fall Risk: None     Assessment:   L knee pain onset about 4 week or so ago.  Since about 1 week after injection he has returned to PLOF.  No knee pain.  Home and community activities are at PLOF.     Clinical Presentation: Stable and/or uncomplicated characteristics    Impairments: None    Functional Limitations: None    Recommended Treatment:  Discharge.      Plan:  Plan of care was developed with input and agreement by the patient.  Discharge.      Subjective:  CC:  L knee pain, since cortisone on 4/26/24 pain is significantly improved.  JEOVANY/ DOI: stepped off of a curb, tweaked L knee.  Pain gradually worsened.  PAIN - Location: L knee. Worst(past 24 hours): 0  Least(past 24 hours): 1  Pain Quality: unable to describe  PAIN - Alleviating:  injection - no meds for pain.   Aggravating: nothing  PLOF:   FUNCTIONAL LIMITATIONS: none at this time.  Since injection he is doing very well.   LIVING ENVIRONMENT: single level house  WORK:  [employer]. Does a lot of stairs.  Works about 20 hours/week.  EXERCISE: not at all   Patient Stated Goal: Not sure.  Symptoms have resolved.    Medical History Form: Reviewed (scanned into  "chart)    Objective:     -GAIT: Independent. he is not using a device. WNL  -STAIRS:  Reciprocally ascends and descends stairs Independently.    -SENSATION: Intact BLEs    PALPATION:   Aleks's left knee palpation exam findings consist of no pes anserine tenderness, no medial joint line tenderness, no tenderness at the lateral joint line, no ITB tenderness, no patellar tendon tenderness, no tenderness at the quad tendon, and no crepitus.    -RANGE OF MOTION:  Knee ROM Right: 130 degrees flexion  full extension  Knee ROM Left: 125 degrees flexion  full extension    FLEXIBILITY  Flexibility of the Lower Extremity: Hamstrings R/L: Minimal limitation / Minimal limitation      STRENGTH  Hip and knee strength is WNL  Patient can negotiate up and down stairs normally and comfortably.  He can squat to about 50% of normal - at PLOF.          Outcome Measures:  Other Measures  Lower Extremity Funtional Score (LEFS): 69     Treatment Performed: (\"NP\" = Not Performed)     Therapeutic Exercise:       minutes      Manual Therapy:       minutes      Neuromuscular Re-education:      minutes      Gait Training:            minutes      Aquatics:            minutes      Therapeutic Activity:      minutes      Modalities:       Vasopneumatic Device       minutes  Electrical Stimulation          minutes  Ultrasound            minutes  Iontophoresis                     minutes  Cold Pack            minutes  Mechanical Traction           minutes    Self Care Home Management:    minutes    Canalith Reposition:          minutes     Education:          minutes    Other:       minutes      Evaluation Complexity: Low: 25 minutes; Moderate   minutes; Complex PT Evaluation Time Entry: 25;  minutes    Re-Evaluation:   minutes    "

## 2024-05-17 ENCOUNTER — EVALUATION (OUTPATIENT)
Dept: PHYSICAL THERAPY | Facility: CLINIC | Age: 69
End: 2024-05-17
Payer: MEDICARE

## 2024-05-17 DIAGNOSIS — M25.562 ACUTE PAIN OF LEFT KNEE: ICD-10-CM

## 2024-05-17 DIAGNOSIS — M25.562 LEFT KNEE PAIN: Primary | ICD-10-CM

## 2024-05-17 PROCEDURE — 97161 PT EVAL LOW COMPLEX 20 MIN: CPT | Mod: GP | Performed by: PHYSICAL THERAPIST

## 2024-05-17 ASSESSMENT — PATIENT HEALTH QUESTIONNAIRE - PHQ9
SUM OF ALL RESPONSES TO PHQ9 QUESTIONS 1 AND 2: 0
1. LITTLE INTEREST OR PLEASURE IN DOING THINGS: NOT AT ALL
2. FEELING DOWN, DEPRESSED OR HOPELESS: NOT AT ALL

## 2024-05-17 NOTE — LETTER
May 17, 2024    Robe Lindo, PT  [address] [facility] [address]  [facility] [address]    Patient: Abbe Boucher   YOB: 1955   Date of Visit: 5/17/2024       Dear Bienvenido Cotter MD  [address] [facility] [address]  [place] [address]    The attached plan of care is being sent to you because your patient’s medical reimbursement requires that you certify the plan of care. Your signature is required to allow uninterrupted insurance coverage.      You may indicate your approval by signing below and faxing this form back to us at Dept Fax: [phone].    Please call Dept: [phone] with any questions or concerns.    Thank you for this referral,        Robe Lindo, PT  [address] [facility] [address]  [address] [facility] [place] [address]    Payer: Payor: MEDICARE / Plan: MEDICARE PART A AND B / Product Type: *No Product type* /                                                                         Date:     Dear Robe Lindo, PT,     Re: Mr. Aleks Boucher, MRN:[id number]    I certify that I have reviewed the attached plan of care and it is medically necessary for Mr. Aleks Boucher (1955) who is under my care.          ______________________________________                    _________________  Provider name and credentials                                           Date and time                                                                                           Plan of Care 5/17/24   Effective from: 5/17/2024  Effective to: 8/15/2024    Plan ID: [id number]            Participants as of Finalize on 5/17/2024    Name Type Comments Contact Info    Bienvenido Cotter MD Referring Provider  [phone]    Robe Lindo PT Physical Therapist  [phone]       Last Plan Note     Author: Robe Lindo PT Status: Incomplete Last edited: 5/17/2024 10:00 AM       Physical Therapy    Physical Therapy Evaluation    Patient Name: Aleks ARRIAGA  Citlaly  MRN: [id number]  Today's Date: 5/17/2024  Time Calculation  Start Time: 1010  Stop Time: 1035  Time Calculation (min): 25 min     Problem List Items Addressed This Visit             ICD-10-CM    Left knee pain - Primary M25.562       Insurance:  Visit number: 1 of MN  Insurance Type: Payor: MEDICARE / Plan: MEDICARE PART A AND B / Product Type: *No Product type* /   Authorization or Plan of Care date Range:    General:  Reason for visit: Left knee pain - Mild arthritic changes left knee with some chondrocalcinosis   suggesting a component of pseudogout.     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  Fall Risk: None     Assessment:   L knee pain onset about 4 week or so ago.  Since about 1 week after injection he has returned to PLOF.  No knee pain.  Home and community activities are at PLOF.     Clinical Presentation: Stable and/or uncomplicated characteristics    Impairments: None    Functional Limitations: None    Recommended Treatment:  Discharge.      Plan:  Plan of care was developed with input and agreement by the patient.  Discharge.      Subjective:  CC:  L knee pain, since cortisone on 4/26/24 pain is significantly improved.  JEOVANY/ DOI: stepped off of a curb, tweaked L knee.  Pain gradually worsened.  PAIN - Location: L knee. Worst(past 24 hours): 0  Least(past 24 hours): 1  Pain Quality: unable to describe  PAIN - Alleviating:  injection - no meds for pain.   Aggravating: nothing  PLOF:   FUNCTIONAL LIMITATIONS: none at this time.  Since injection he is doing very well.   LIVING ENVIRONMENT: single level house  WORK:  [employer]. Does a lot of stairs.  Works about 20 hours/week.  EXERCISE: not at all   Patient Stated Goal: Not sure.  Symptoms have resolved.    Medical History Form: Reviewed (scanned into chart)    Objective:     -GAIT: Independent. he is not using a device.  "WNL  -STAIRS:  Reciprocally ascends and descends stairs Independently.    -SENSATION: Intact BLEs    PALPATION:   Aleks's left knee palpation exam findings consist of no pes anserine tenderness, no medial joint line tenderness, no tenderness at the lateral joint line, no ITB tenderness, no patellar tendon tenderness, no tenderness at the quad tendon, and no crepitus.    -RANGE OF MOTION:  Knee ROM Right: 130 degrees flexion  full extension  Knee ROM Left: 125 degrees flexion  full extension    FLEXIBILITY  Flexibility of the Lower Extremity: Hamstrings R/L: Minimal limitation / Minimal limitation      STRENGTH  Hip and knee strength is WNL  Patient can negotiate up and down stairs normally and comfortably.  He can squat to about 50% of normal - at PLOF.          Outcome Measures:  Other Measures  Lower Extremity Funtional Score (LEFS): 69     Treatment Performed: (\"NP\" = Not Performed)     Therapeutic Exercise:       minutes      Manual Therapy:       minutes      Neuromuscular Re-education:      minutes      Gait Training:            minutes      Aquatics:            minutes      Therapeutic Activity:      minutes      Modalities:       Vasopneumatic Device       minutes  Electrical Stimulation          minutes  Ultrasound            minutes  Iontophoresis                     minutes  Cold Pack            minutes  Mechanical Traction           minutes    Self Care Home Management:    minutes    Canalith Reposition:          minutes     Education:          minutes    Other:       minutes      Evaluation Complexity: Low: 25 minutes; Moderate   minutes; Complex PT Evaluation Time Entry: 25;  minutes    Re-Evaluation:   minutes           Current Participants as of 5/17/2024    Name Type Comments Contact Info    Bienvenido Cotter MD Referring Provider  [phone]    Signature pending    Robe Lindo, PT Physical Therapist  [phone]    Signature pending      "

## 2024-06-17 DIAGNOSIS — E03.9 HYPOTHYROIDISM, UNSPECIFIED TYPE: ICD-10-CM

## 2024-06-17 DIAGNOSIS — G62.9 NEUROPATHY: ICD-10-CM

## 2024-06-17 DIAGNOSIS — E78.5 HYPERLIPIDEMIA, UNSPECIFIED HYPERLIPIDEMIA TYPE: ICD-10-CM

## 2024-06-17 RX ORDER — LEVOTHYROXINE SODIUM 50 UG/1
50 TABLET ORAL DAILY
Qty: 90 TABLET | Refills: 0 | Status: SHIPPED | OUTPATIENT
Start: 2024-06-17 | End: 2024-12-14

## 2024-06-17 RX ORDER — ATORVASTATIN CALCIUM 10 MG/1
10 TABLET, FILM COATED ORAL DAILY
Qty: 90 TABLET | Refills: 0 | Status: SHIPPED | OUTPATIENT
Start: 2024-06-17

## 2024-06-17 RX ORDER — GABAPENTIN 300 MG/1
300 CAPSULE ORAL 3 TIMES DAILY
Qty: 270 CAPSULE | Refills: 0 | Status: SHIPPED | OUTPATIENT
Start: 2024-06-17 | End: 2024-12-14

## 2024-06-26 ENCOUNTER — HOSPITAL ENCOUNTER (OUTPATIENT)
Dept: RADIOLOGY | Facility: CLINIC | Age: 69
Discharge: HOME | End: 2024-06-26
Payer: MEDICARE

## 2024-06-26 ENCOUNTER — APPOINTMENT (OUTPATIENT)
Dept: PRIMARY CARE | Facility: CLINIC | Age: 69
End: 2024-06-26
Payer: MEDICARE

## 2024-06-26 VITALS — SYSTOLIC BLOOD PRESSURE: 106 MMHG | DIASTOLIC BLOOD PRESSURE: 78 MMHG | BODY MASS INDEX: 36.21 KG/M2 | WEIGHT: 282 LBS

## 2024-06-26 DIAGNOSIS — E03.9 HYPOTHYROIDISM, UNSPECIFIED TYPE: ICD-10-CM

## 2024-06-26 DIAGNOSIS — I48.11 LONGSTANDING PERSISTENT ATRIAL FIBRILLATION (MULTI): ICD-10-CM

## 2024-06-26 DIAGNOSIS — K76.9 LIVER LESION: ICD-10-CM

## 2024-06-26 DIAGNOSIS — E11.9 TYPE 2 DIABETES MELLITUS WITHOUT COMPLICATION, WITHOUT LONG-TERM CURRENT USE OF INSULIN (MULTI): ICD-10-CM

## 2024-06-26 DIAGNOSIS — N40.0 BENIGN PROSTATIC HYPERPLASIA, UNSPECIFIED WHETHER LOWER URINARY TRACT SYMPTOMS PRESENT: ICD-10-CM

## 2024-06-26 DIAGNOSIS — R91.1 LUNG NODULE: ICD-10-CM

## 2024-06-26 DIAGNOSIS — K76.9 LIVER LESION: Primary | ICD-10-CM

## 2024-06-26 DIAGNOSIS — E78.5 HYPERLIPIDEMIA, UNSPECIFIED HYPERLIPIDEMIA TYPE: ICD-10-CM

## 2024-06-26 PROCEDURE — 3074F SYST BP LT 130 MM HG: CPT | Performed by: STUDENT IN AN ORGANIZED HEALTH CARE EDUCATION/TRAINING PROGRAM

## 2024-06-26 PROCEDURE — 76705 ECHO EXAM OF ABDOMEN: CPT

## 2024-06-26 PROCEDURE — G2211 COMPLEX E/M VISIT ADD ON: HCPCS | Performed by: STUDENT IN AN ORGANIZED HEALTH CARE EDUCATION/TRAINING PROGRAM

## 2024-06-26 PROCEDURE — 76705 ECHO EXAM OF ABDOMEN: CPT | Performed by: RADIOLOGY

## 2024-06-26 PROCEDURE — 3078F DIAST BP <80 MM HG: CPT | Performed by: STUDENT IN AN ORGANIZED HEALTH CARE EDUCATION/TRAINING PROGRAM

## 2024-06-26 PROCEDURE — 99215 OFFICE O/P EST HI 40 MIN: CPT | Performed by: STUDENT IN AN ORGANIZED HEALTH CARE EDUCATION/TRAINING PROGRAM

## 2024-06-26 PROCEDURE — 3044F HG A1C LEVEL LT 7.0%: CPT | Performed by: STUDENT IN AN ORGANIZED HEALTH CARE EDUCATION/TRAINING PROGRAM

## 2024-06-26 RX ORDER — METOPROLOL SUCCINATE 100 MG/1
100 TABLET, EXTENDED RELEASE ORAL 2 TIMES DAILY
Qty: 200 TABLET | Refills: 1 | Status: SHIPPED | OUTPATIENT
Start: 2024-06-26

## 2024-06-26 RX ORDER — ATORVASTATIN CALCIUM 20 MG/1
20 TABLET, FILM COATED ORAL DAILY
Qty: 90 TABLET | Refills: 1 | Status: SHIPPED | OUTPATIENT
Start: 2024-06-26

## 2024-06-26 ASSESSMENT — PATIENT HEALTH QUESTIONNAIRE - PHQ9
1. LITTLE INTEREST OR PLEASURE IN DOING THINGS: NOT AT ALL
SUM OF ALL RESPONSES TO PHQ9 QUESTIONS 1 AND 2: 0
2. FEELING DOWN, DEPRESSED OR HOPELESS: NOT AT ALL

## 2024-06-26 NOTE — PROGRESS NOTES
Subjective   Patient ID: Abbe Boucher is a 69 y.o. male who presents for Follow-up, Med Refill, and little bit of a cough.    HPI   Routine fu.  He still has an intermittent cough, some improvement with advair.  No fevers or SoB.    Should and knee pain improved after steroid injections.    He wants to review recent chest CT  Review of Systems  12-point ROS reviewed and was negative unless otherwise noted in HPI.    Objective   There were no vitals taken for this visit.    Physical Exam  GEN: conversant, NAD  HEENT: PERRL, EOMI, MMM  NECK: supple, no carotid bruits appreciated b/l  CV: S1, S2, RRR  PULM: CTAB  ABD: soft, NT, obese  NEURO: no new gross focal deficits  EXT: +BLE edema  PSYCH: appropriate affect    Assessment/Plan        #Lung nodule: repeat 3 mo fu CT chest ordered.    #Liver lesion: Liver US ordered    #HLD/CAD: increase atorvastatin to 20mg daily     #H/o VT arrest s/p ICD  Follows with EP      #Permanent A. fib  Well-controlled  Following with cardiology     #Diabetes, type 2: Continue Ozempic 0.5mg, discussed healthy lifestyle habits.  Seeing endocrinology. Recommended diabetic shoes/inserts     #Hypothyroidism: Continue levothyroxine     #BPH - continue tamsulosin     #Neuropathy: chronic, continue gabapentin     #Health maintenance: he says that he had a colonoscopy in 2022, no polyps. CT Chest ordered. Advised Shingrix, RSV vaccine, and yearly flu shot. Received COVID-19 vaccines. Received Pneumovax 23. He declines TDaP. Longitudinal care.     RTC 3 months, labs at that time

## 2024-07-10 ENCOUNTER — HOSPITAL ENCOUNTER (OUTPATIENT)
Dept: CARDIOLOGY | Facility: CLINIC | Age: 69
Discharge: HOME | End: 2024-07-10
Payer: MEDICARE

## 2024-07-10 DIAGNOSIS — Z95.810 PRESENCE OF AUTOMATIC CARDIOVERTER/DEFIBRILLATOR (AICD): ICD-10-CM

## 2024-07-10 DIAGNOSIS — I49.01 VENTRICULAR FIBRILLATION (MULTI): ICD-10-CM

## 2024-07-10 PROCEDURE — 93296 REM INTERROG EVL PM/IDS: CPT

## 2024-07-10 PROCEDURE — 93295 DEV INTERROG REMOTE 1/2/MLT: CPT | Performed by: INTERNAL MEDICINE

## 2024-07-13 DIAGNOSIS — I48.11 LONGSTANDING PERSISTENT ATRIAL FIBRILLATION (MULTI): ICD-10-CM

## 2024-07-16 ENCOUNTER — TELEPHONE (OUTPATIENT)
Dept: CARDIOLOGY | Facility: CLINIC | Age: 69
End: 2024-07-16
Payer: MEDICARE

## 2024-07-16 DIAGNOSIS — I48.11 LONGSTANDING PERSISTENT ATRIAL FIBRILLATION (MULTI): ICD-10-CM

## 2024-07-16 RX ORDER — RIVAROXABAN 20 MG/1
20 TABLET, FILM COATED ORAL NIGHTLY
Qty: 90 TABLET | Refills: 3 | Status: SHIPPED | OUTPATIENT
Start: 2024-07-16 | End: 2024-07-16 | Stop reason: SDUPTHER

## 2024-07-16 NOTE — TELEPHONE ENCOUNTER
[facility] pharmacy called requesting a refill for Xarelto 20mg be faxed to [phone]. 90 day supply.

## 2024-07-17 ENCOUNTER — HOSPITAL ENCOUNTER (OUTPATIENT)
Dept: RADIOLOGY | Facility: CLINIC | Age: 69
Discharge: HOME | End: 2024-07-17
Payer: MEDICARE

## 2024-07-17 DIAGNOSIS — R91.1 LUNG NODULE: ICD-10-CM

## 2024-07-17 PROCEDURE — 71250 CT THORAX DX C-: CPT

## 2024-07-26 ENCOUNTER — APPOINTMENT (OUTPATIENT)
Dept: ORTHOPEDIC SURGERY | Facility: CLINIC | Age: 69
End: 2024-07-26
Payer: MEDICARE

## 2024-07-26 VITALS — BODY MASS INDEX: 36.7 KG/M2 | HEIGHT: 74 IN | WEIGHT: 286 LBS

## 2024-07-26 DIAGNOSIS — M75.81 ROTATOR CUFF TENDINITIS, RIGHT: Primary | ICD-10-CM

## 2024-07-26 DIAGNOSIS — G89.29 CHRONIC RIGHT SHOULDER PAIN: ICD-10-CM

## 2024-07-26 DIAGNOSIS — M25.511 CHRONIC RIGHT SHOULDER PAIN: ICD-10-CM

## 2024-07-26 PROCEDURE — 99213 OFFICE O/P EST LOW 20 MIN: CPT | Performed by: ORTHOPAEDIC SURGERY

## 2024-07-26 PROCEDURE — 20610 DRAIN/INJ JOINT/BURSA W/O US: CPT | Performed by: ORTHOPAEDIC SURGERY

## 2024-07-26 PROCEDURE — 3008F BODY MASS INDEX DOCD: CPT | Performed by: ORTHOPAEDIC SURGERY

## 2024-07-26 PROCEDURE — 3044F HG A1C LEVEL LT 7.0%: CPT | Performed by: ORTHOPAEDIC SURGERY

## 2024-07-26 PROCEDURE — 1160F RVW MEDS BY RX/DR IN RCRD: CPT | Performed by: ORTHOPAEDIC SURGERY

## 2024-07-26 PROCEDURE — 1036F TOBACCO NON-USER: CPT | Performed by: ORTHOPAEDIC SURGERY

## 2024-07-26 PROCEDURE — 1159F MED LIST DOCD IN RCRD: CPT | Performed by: ORTHOPAEDIC SURGERY

## 2024-07-26 RX ORDER — LIDOCAINE HYDROCHLORIDE 10 MG/ML
2 INJECTION INFILTRATION; PERINEURAL
Status: COMPLETED | OUTPATIENT
Start: 2024-07-26 | End: 2024-07-26

## 2024-07-26 RX ORDER — TRIAMCINOLONE ACETONIDE 40 MG/ML
40 INJECTION, SUSPENSION INTRA-ARTICULAR; INTRAMUSCULAR
Status: COMPLETED | OUTPATIENT
Start: 2024-07-26 | End: 2024-07-26

## 2024-07-26 NOTE — PROGRESS NOTES
L Inj/Asp: R subacromial bursa on 7/26/2024 4:30 PM  Indications: pain  Details: 22 G needle, posterior approach  Medications: 40 mg triamcinolone acetonide 40 mg/mL; 2 mL lidocaine 10 mg/mL (1 %)  Procedure, treatment alternatives, risks and benefits explained, specific risks discussed. Consent was given by the patient.       69-year-old is seen with right shoulder pain.  He has been having persistent moderate throbbing pain in the right shoulder.  He has difficulty with overhead reaching and lifting activities.  He had relief with a cortisone injection in April.  He also had an injection in the left knee at that time and the left knee continues to do well.    Pleasant and no acute distress.  Right shoulder forward flexion 160°. No effusion or instability. There is positive Neer and [place] impingement. There is subacromial crepitus and tenderness around the subacromial space. No biceps tenderness. No acromioclavicular tenderness. Rotator cuff strength is intact but there is discomfort with strength testing. Left shoulder forward flexion 180°. No effusion or instability. No impingement or crepitus or tenderness involving the subacromial space. No biceps or acromioclavicular tenderness. There is intact rotator cuff strength. There is adequate range of motion of the cervical spine without pain. Both upper extremities are well perfused, skin is intact and muscle tone is adequate. Elbow flexion and extension and wrist flexion and extension strength are intact.    A discussion about his shoulder was done.  Treatment options reviewed and the decision was made to repeat the cortisone injection.  He had been doing well until he was vacuuming with a heavy vacuum and moving the vacuum around aggravated the pain.  He has a defibrillator which would potentially be MRI safe.  He will follow-up based on his symptoms.  If he is having persistent pain MRI could be obtained if his defibrillator allows for this.  Otherwise  musculoskeletal ultrasound will be obtained to assess the integrity of his rotator cuff.

## 2024-08-14 DIAGNOSIS — N40.0 BENIGN PROSTATIC HYPERPLASIA, UNSPECIFIED WHETHER LOWER URINARY TRACT SYMPTOMS PRESENT: ICD-10-CM

## 2024-08-14 RX ORDER — TAMSULOSIN HYDROCHLORIDE 0.4 MG/1
0.4 CAPSULE ORAL NIGHTLY
Qty: 90 CAPSULE | Refills: 1 | Status: SHIPPED | OUTPATIENT
Start: 2024-08-14

## 2024-08-20 ENCOUNTER — OFFICE VISIT (OUTPATIENT)
Dept: CARDIOLOGY | Facility: CLINIC | Age: 69
End: 2024-08-20
Payer: MEDICARE

## 2024-08-20 VITALS
DIASTOLIC BLOOD PRESSURE: 94 MMHG | OXYGEN SATURATION: 96 % | WEIGHT: 284 LBS | BODY MASS INDEX: 36.46 KG/M2 | HEART RATE: 75 BPM | SYSTOLIC BLOOD PRESSURE: 146 MMHG

## 2024-08-20 DIAGNOSIS — I10 PRIMARY HYPERTENSION: ICD-10-CM

## 2024-08-20 DIAGNOSIS — Z95.810 IMPLANTABLE CARDIOVERTER-DEFIBRILLATOR (ICD) IN SITU: ICD-10-CM

## 2024-08-20 DIAGNOSIS — I48.11 LONGSTANDING PERSISTENT ATRIAL FIBRILLATION (MULTI): Primary | ICD-10-CM

## 2024-08-20 DIAGNOSIS — Z98.890 H/O MITRAL VALVE REPAIR: ICD-10-CM

## 2024-08-20 PROCEDURE — 93010 ELECTROCARDIOGRAM REPORT: CPT | Performed by: STUDENT IN AN ORGANIZED HEALTH CARE EDUCATION/TRAINING PROGRAM

## 2024-08-20 PROCEDURE — 93005 ELECTROCARDIOGRAM TRACING: CPT | Performed by: STUDENT IN AN ORGANIZED HEALTH CARE EDUCATION/TRAINING PROGRAM

## 2024-08-20 PROCEDURE — 1159F MED LIST DOCD IN RCRD: CPT | Performed by: STUDENT IN AN ORGANIZED HEALTH CARE EDUCATION/TRAINING PROGRAM

## 2024-08-20 PROCEDURE — 99213 OFFICE O/P EST LOW 20 MIN: CPT | Performed by: STUDENT IN AN ORGANIZED HEALTH CARE EDUCATION/TRAINING PROGRAM

## 2024-08-20 PROCEDURE — 3077F SYST BP >= 140 MM HG: CPT | Performed by: STUDENT IN AN ORGANIZED HEALTH CARE EDUCATION/TRAINING PROGRAM

## 2024-08-20 PROCEDURE — 3080F DIAST BP >= 90 MM HG: CPT | Performed by: STUDENT IN AN ORGANIZED HEALTH CARE EDUCATION/TRAINING PROGRAM

## 2024-08-20 PROCEDURE — 3044F HG A1C LEVEL LT 7.0%: CPT | Performed by: STUDENT IN AN ORGANIZED HEALTH CARE EDUCATION/TRAINING PROGRAM

## 2024-08-20 RX ORDER — KETOCONAZOLE 20 MG/G
CREAM TOPICAL
COMMUNITY
Start: 2024-04-03

## 2024-08-20 RX ORDER — ERYTHROMYCIN 5 MG/G
OINTMENT OPHTHALMIC
COMMUNITY
Start: 2024-08-01

## 2024-08-20 ASSESSMENT — ENCOUNTER SYMPTOMS
SYNCOPE: 0
NEAR-SYNCOPE: 0
CONSTITUTIONAL NEGATIVE: 1
ORTHOPNEA: 0
PSYCHIATRIC NEGATIVE: 1
PND: 0
ENDOCRINE NEGATIVE: 1
DYSPNEA ON EXERTION: 0
HEMATOLOGIC/LYMPHATIC NEGATIVE: 1
NEUROLOGICAL NEGATIVE: 1
RESPIRATORY NEGATIVE: 1
MUSCULOSKELETAL NEGATIVE: 1
PALPITATIONS: 0
GASTROINTESTINAL NEGATIVE: 1

## 2024-08-20 NOTE — PROGRESS NOTES
"    [facility] Cardiology Outpatient Follow-up Visit     Reason for Visit: paroxysmal afib; hx MVR; hx VF s/p secondary prevention ICD.     HPI: Aleks Boucher \"Abbe\" is a 69 y.o.  male who presents today for a follow-up for paroxysmal afib; hx MVR; hx VF s/p secondary prevention ICD. Past medical history of MV repair (12/15/2008 @ [facility]; Mini MAZE + JAMARI excision), hx VF arrest s/p secondary prevention ICD ([employer] > s/p generator changeout 4/6/2023), atrial fibrillation (s/p mini MAZE 12/2008; on rivaroxaban), non-insulin dependent DM, and remote hx of DVT.      Patient had moved back from [place] to the [facility]. Previously follow under care of cardiology team at [facility]. Records requested per Dr. Ramicone. Patient has reestablished with electrophysiology and was referred to establish care with general cardiology.      Aleks presented to clinic on 11/11/2022. No active cardiac complaints at this time. No chest pain, dyspnea, orthopnea, PND, syncope, pre-syncope, or ICD discharges. Tolerating systemic anticoagulant without significant bleeding issues.      Aleks returned to cardiology clinic on 5/26/2023. S/p recent ICD generator change out due to end of device life. S/p [employer] ICD 4/6/2023 with Dr. Ramicone. No acute cardiac complaints at this time. Tolerating activity without significant limitations. Euvolemic on exam. ICD pocket well-healed.     Aleks returned to cardiology clinic on 11/28/2023. Patient getting dental cleaning; crown, and pin placement by his dental team in near future. Given ICD and history of MV repair > patient will need dental prophylaxis.  Prior history of allergy to penicillins; will use doxycycline 100 mg once ~ 30 minutes prior to procedure. Patient is also on chronic systemic anticoagulation for atrial fibrillation and remote hx of DVT with rivaroxaban; patient instructed to hold rivaroxaban 48 hours prior to procedure and to resume evening after " dental procedure.     Abbe returned to cardiology clinic on 8/20/2024. No active cardiac complaints.  Notes chronic left knee arthritis. Tolerating rivaroxaban without significant bleeding episodes.   No recent ICD shocks.  BP is well-controlled. Rare episodes of light headedness when transitioning from sitting to standing.  No syncope. No falls. Will continue medical management as below.     Past Medical History:   - As above    Surgical History:   - As above    Family History:   Family History   Problem Relation Name Age of Onset    Cancer Mother      Diabetes Mother      Coronary artery disease Father         Allergies:  Amoxil [amoxicillin], Neomycin-bacitracin-polymyxin, and Penicillins     Social History:   - Never a smoker; no significant alcohol use; no illicit drugy use   - Works at [employer]; more active than prior     Prior Cardiovascular Testing (Personally Reviewed):     EKG October 21, 2022: Atrial fibrillation with a controlled ventricular response.      Lipid Panel (10/2022)- total cholesterol 156, HDL 41, LDL 75, Triglycerides 198 mg/dl     Hgb A1 C (10/2022)- 6.1%       Review of Systems:  Review of Systems   Constitutional: Negative.   HENT: Negative.     Cardiovascular:  Negative for chest pain, dyspnea on exertion, near-syncope, orthopnea, palpitations, paroxysmal nocturnal dyspnea and syncope.        Compression stocking in place   Respiratory: Negative.     Endocrine: Negative.    Hematologic/Lymphatic: Negative.    Skin: Negative.    Musculoskeletal:  Positive for arthritis and joint pain.   Gastrointestinal: Negative.    Genitourinary: Negative.    Neurological: Negative.    Psychiatric/Behavioral: Negative.         Outpatient Medications:    Current Outpatient Medications:     albuterol 90 mcg/actuation inhaler, Inhale 1 puff every 6 hours if needed., Disp: , Rfl:     atorvastatin (Lipitor) 20 mg tablet, Take 1 tablet (20 mg) by mouth once daily., Disp: 90 tablet, Rfl: 1     clindamycin (Cleocin) 150 mg capsule, Take 2 capsules (300 mg) by mouth. 1 HOUR PRIOR TO DENTAL APPOINTMENT. TAKE 1 CAPSULE 6 HOURS AFTER APPOINTMENT., Disp: , Rfl:     coenzyme Q-10 200 mg capsule, Coenzyme Q10 200 MG CAPS Refills: 0, Disp: , Rfl:     erythromycin (Romycin) 5 mg/gram (0.5 %) ophthalmic ointment, , Disp: , Rfl:     fluticasone propion-salmeteroL (Advair Diskus) 250-50 mcg/dose diskus inhaler, Inhale 1 puff 2 times a day. Rinse mouth with water after use to reduce aftertaste and incidence of candidiasis. Do not swallow., Disp: 60 each, Rfl: 3    gabapentin (Neurontin) 300 mg capsule, Take 1 capsule (300 mg) by mouth 3 times a day., Disp: 270 capsule, Rfl: 0    ginkgo biloba 40 mg tablet, Take 120 mg by mouth once daily., Disp: , Rfl:     ketoconazole (NIZOral) 2 % cream, , Disp: , Rfl:     KRILL OIL ORAL, Take 1,000 mg by mouth once daily., Disp: , Rfl:     levothyroxine (Synthroid, Levoxyl) 50 mcg tablet, Take 1 tablet (50 mcg) by mouth once daily., Disp: 90 tablet, Rfl: 0    metoprolol succinate XL (Toprol-XL) 100 mg 24 hr tablet, Take 1 tablet (100 mg) by mouth 2 times a day., Disp: 200 tablet, Rfl: 1    multivitamin-minerals, adult, (Centrum) 0.4-162-18 mg tablet, Take 1 tablet by mouth 2 times a day., Disp: , Rfl:     rivaroxaban (Xarelto) 20 mg tablet, Take 1 tablet (20 mg) by mouth once daily at bedtime., Disp: 90 tablet, Rfl: 3    semaglutide 0.25 mg or 0.5 mg (2 mg/3 mL) pen injector, Inject 0.5 mg under the skin 1 (one) time per week., Disp: 0.5 mL, Rfl: 11    Symbicort 80-4.5 mcg/actuation inhaler, Inhale 2 puffs 2 times a day., Disp: 10.2 g, Rfl: 1    tamsulosin (Flomax) 0.4 mg 24 hr capsule, Take 1 capsule (0.4 mg) by mouth once daily at bedtime., Disp: 90 capsule, Rfl: 1     Last Recorded Vitals  BP (!) 146/94 (BP Location: Left arm, Patient Position: Sitting, BP Cuff Size: Large adult)   Pulse 75   Wt 129 kg (284 lb)   SpO2 96%   BMI 36.46 kg/m²     Physical Exam:    Physical  "Exam  Constitutional:       Appearance: He is obese.   HENT:      Head: Normocephalic.      Mouth/Throat:      Mouth: Mucous membranes are moist.   Eyes:      Extraocular Movements: Extraocular movements intact.      Conjunctiva/sclera: Conjunctivae normal.   Neck:      Vascular: No JVD.   Cardiovascular:      Rate and Rhythm: Normal rate and regular rhythm.   Pulmonary:      Effort: Pulmonary effort is normal. No respiratory distress.      Breath sounds: Normal breath sounds.   Abdominal:      General: There is no distension.      Palpations: Abdomen is soft.   Musculoskeletal:         General: No swelling.   Skin:     General: Skin is warm and dry.   Neurological:      General: No focal deficit present.      Mental Status: He is alert.      Cranial Nerves: No cranial nerve deficit.      Motor: No weakness.   Psychiatric:         Mood and Affect: Mood normal.         Behavior: Behavior normal.         Lab/Radiology/Diagnostic Review:    Labs    Lab Results   Component Value Date    GLUCOSE 104 (H) 04/22/2024    CALCIUM 9.5 04/22/2024     04/22/2024    K 4.8 04/22/2024    CO2 25 04/22/2024     04/22/2024    BUN 20 04/22/2024    CREATININE 1.11 04/22/2024       Lab Results   Component Value Date    WBC 6.8 09/21/2023    HGB 15.4 09/21/2023    HCT 44.8 09/21/2023    MCV 97 09/21/2023     09/21/2023       Lab Results   Component Value Date    CHOL 148 09/21/2023    CHOL 156 10/27/2022     Lab Results   Component Value Date    HDL 46.7 09/21/2023    HDL 41.0 10/27/2022     No results found for: \"LDLCALC\"  Lab Results   Component Value Date    TRIG 222 (H) 09/21/2023    TRIG 198 (H) 10/27/2022       Lab Results   Component Value Date    TSH 2.47 09/21/2023       Assessment:   69 y.o.  male who presents today for a follow-up for paroxysmal afib; hx MVR; hx VF s/p secondary prevention ICD. Past medical history of MV repair (12/15/2008 @ [facility]; Mini MAZE + JAMARI excision), hx VF arrest s/p " secondary prevention ICD ([employer] > s/p generator changeout 4/6/2023), atrial fibrillation (s/p mini MAZE 12/2008; on rivaroxaban), non-insulin dependent DM, and remote hx of DVT.     Abbe returned to cardiology clinic on 8/20/2024. No active cardiac complaints.  Notes chronic left knee arthritis. Tolerating rivaroxaban without significant bleeding episodes.   No recent ICD shocks.  BP is well-controlled. Rare episodes of light headedness when transitioning from sitting to standing.  No syncope. No falls. Will continue medical management as below.     Overall Plan:  1. Atrial fibrillation- continue rivaroxaban 20 mg daily; metoprolol succinate 100 mg daily; currently well-rate controlled and asymptomatic (11/28/2023)     2. Hx MV regurgitation s/p repair- appears euvolemic on exam; no clinical evidence of decompensation; patient need dental prophylaxis as he is s/p MV repair prior to any dental procedures     3. DLD (goal LDL < 100)- continue atorvastatin     4. Hx of DVT- continue rivaroxaban as above     5. Hx VF s/p secondary prevention ICD ([employer] [employer])- follow-up as directed with EP; continue beta blockade; S/P ICD generator exchange 4/6/2023 due to end of device life     6. HTN- well-controlled; continue current regiment    7. Pre-op dental work / crown placement- Given ICD and history of MV repair > patient will need dental prophylaxis.  Prior history of allergy to penicillins; will use doxycycline 100 mg once ~ 30 minutes prior to procedure.    8. Left knee arthritis  - follow-up as per orthopedic surgery     Disposition- Return to cardiology clinic in ~ 8-12 months    Thank you for your visit today. Please contact our office with any questions.     Andry Lopez MD

## 2024-08-20 NOTE — PATIENT INSTRUCTIONS
Thank you for your visit today. Please contact our office (via MyChart or phone) with any additional questions.     [facility]    Arleen, RN/Clinic Nurse for:    Dr. Jessica Chaparro    [address], [address]  [place] [address]    Phone: [phone] Press Option 5 then Option 3 to speak with the Clinic Nurse (Arleen)    _____    To Reach:    Billing Questions -    [phone]  Scheduling / Rescheduling -  Option 1  Refills / Medication Requests -  Option 3  General Office / [place] -  Option 4  Results -     Option 6  Medical Records -    Option 7  Repeat Options -    Option 9

## 2024-09-12 DIAGNOSIS — G62.9 NEUROPATHY: ICD-10-CM

## 2024-09-12 DIAGNOSIS — E03.9 HYPOTHYROIDISM, UNSPECIFIED TYPE: ICD-10-CM

## 2024-09-12 RX ORDER — GABAPENTIN 300 MG/1
300 CAPSULE ORAL 3 TIMES DAILY
Qty: 270 CAPSULE | Refills: 1 | Status: SHIPPED | OUTPATIENT
Start: 2024-09-12 | End: 2025-03-11

## 2024-09-12 RX ORDER — LEVOTHYROXINE SODIUM 50 UG/1
50 TABLET ORAL DAILY
Qty: 90 TABLET | Refills: 1 | Status: SHIPPED | OUTPATIENT
Start: 2024-09-12 | End: 2025-03-11

## 2024-09-13 ENCOUNTER — OFFICE VISIT (OUTPATIENT)
Dept: ORTHOPEDIC SURGERY | Facility: CLINIC | Age: 69
End: 2024-09-13
Payer: MEDICARE

## 2024-09-13 DIAGNOSIS — M25.562 CHRONIC PAIN OF LEFT KNEE: ICD-10-CM

## 2024-09-13 DIAGNOSIS — G89.29 CHRONIC PAIN OF LEFT KNEE: ICD-10-CM

## 2024-09-13 DIAGNOSIS — M17.12 ARTHRITIS OF KNEE, LEFT: Primary | ICD-10-CM

## 2024-09-13 PROCEDURE — 99213 OFFICE O/P EST LOW 20 MIN: CPT

## 2024-09-13 PROCEDURE — 2500000005 HC RX 250 GENERAL PHARMACY W/O HCPCS

## 2024-09-13 PROCEDURE — 1036F TOBACCO NON-USER: CPT

## 2024-09-13 PROCEDURE — 3044F HG A1C LEVEL LT 7.0%: CPT

## 2024-09-13 PROCEDURE — 2500000004 HC RX 250 GENERAL PHARMACY W/ HCPCS (ALT 636 FOR OP/ED)

## 2024-09-13 PROCEDURE — 20610 DRAIN/INJ JOINT/BURSA W/O US: CPT | Mod: LT

## 2024-09-13 PROCEDURE — 1160F RVW MEDS BY RX/DR IN RCRD: CPT

## 2024-09-13 PROCEDURE — 1159F MED LIST DOCD IN RCRD: CPT

## 2024-09-14 RX ORDER — TRIAMCINOLONE ACETONIDE 40 MG/ML
40 INJECTION, SUSPENSION INTRA-ARTICULAR; INTRAMUSCULAR
Status: COMPLETED | OUTPATIENT
Start: 2024-09-13 | End: 2024-09-13

## 2024-09-14 RX ORDER — LIDOCAINE HYDROCHLORIDE 20 MG/ML
2 INJECTION, SOLUTION INFILTRATION; PERINEURAL
Status: COMPLETED | OUTPATIENT
Start: 2024-09-13 | End: 2024-09-13

## 2024-09-14 ASSESSMENT — ENCOUNTER SYMPTOMS: KNEE DEFORMITY: 1

## 2024-09-14 NOTE — PROGRESS NOTES
Subjective    Patient ID: Abbe Boucher is a 69 y.o. male.    Chief Complaint: Follow-up of the Left Knee    Left Knee       Pleasant 69-year-old male presenting to the office for follow-up of left knee pain, left knee arthritis.  He was last seen by Dr. Cotter on April 26, 2024, when he received a left knee intra-articular steroid injection, which was of benefit up until the last few weeks.  There is been no recent injury.  He points to the medial aspect of his knee when describing pain.  Pain is described as a sharp shooting pain.  Pain is worse with activities day living including prolonged standing walking and stair climbing.  He does have somewhat difficulty getting up and down from a chair in and out of a car due to the pain.  He has been taking aspirin and Tylenol as well as applying ice and using a knee brace with minimal relief of symptoms.  He is on Xarelto, therefore cannot take NSAIDs.  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  Denies recent redness or warmth to the left knee.    The patient's past medical, surgical, family, and social history as well as allergies and medications were reviewed and updated in the chart.    Objective   Ortho Exam  Pleasant and no acute distress. Walks with antalgic gait. Stands with varus alignment of the left knee and neutral on the right. Left knee range of motion is 5-110°. The knee is stable to varus and valgus stress Lachman and posterior drawer. There is a mild effusion. There is generalized tenderness. Right knee range of motion is 0-120°. There is no effusion. The knee is stable to varus and valgus stress Lachman and posterior drawer. Both lower extremities are well perfused the skin is intact and muscle tone is adequate.     Assessment/Plan   Encounter Diagnoses:  Arthritis of knee, left    Chronic pain of left knee    Plan: Discussion with patient in regards to continued diagnosis of left knee arthritis with review of conservative and  surgical treatment options.  Patient would like to proceed with conservative treatment options, and desired a left knee intra-articular steroid injection today which was performed and tolerated well.  He is aware he can get these injections every 3 to 4 months as needed.  He can continue with Tylenol and ice application.  He should avoid aggravating activities.  He should wear knee sleeve or brace with any prolonged standing walking or stair climbing.  He can use heat or ice application.  He can follow-up as symptoms dictate.    L Inj/Asp: L knee on 9/13/2024 5:40 PM  Indications: pain  Details: 22 G needle, superolateral approach  Medications: 40 mg triamcinolone acetonide 40 mg/mL; 2 mL lidocaine 20 mg/mL (2 %)  Procedure, treatment alternatives, risks and benefits explained, specific risks discussed. Consent was given by the patient.

## 2024-10-07 ENCOUNTER — APPOINTMENT (OUTPATIENT)
Dept: CARDIOLOGY | Facility: CLINIC | Age: 69
End: 2024-10-07
Payer: MEDICARE

## 2024-10-08 ENCOUNTER — HOSPITAL ENCOUNTER (OUTPATIENT)
Dept: CARDIOLOGY | Facility: CLINIC | Age: 69
Discharge: HOME | End: 2024-10-08
Payer: MEDICARE

## 2024-10-08 DIAGNOSIS — I49.01 VENTRICULAR FIBRILLATION (MULTI): ICD-10-CM

## 2024-10-08 DIAGNOSIS — Z95.810 PRESENCE OF AUTOMATIC CARDIOVERTER/DEFIBRILLATOR (AICD): ICD-10-CM

## 2024-10-23 ENCOUNTER — LAB (OUTPATIENT)
Dept: LAB | Facility: LAB | Age: 69
End: 2024-10-23
Payer: MEDICARE

## 2024-10-23 ENCOUNTER — APPOINTMENT (OUTPATIENT)
Dept: PRIMARY CARE | Facility: CLINIC | Age: 69
End: 2024-10-23
Payer: MEDICARE

## 2024-10-23 VITALS
HEIGHT: 74 IN | SYSTOLIC BLOOD PRESSURE: 118 MMHG | BODY MASS INDEX: 35.94 KG/M2 | DIASTOLIC BLOOD PRESSURE: 85 MMHG | WEIGHT: 280 LBS

## 2024-10-23 DIAGNOSIS — Z12.5 SCREENING FOR PROSTATE CANCER: ICD-10-CM

## 2024-10-23 DIAGNOSIS — Z00.00 HEALTH CARE MAINTENANCE: ICD-10-CM

## 2024-10-23 DIAGNOSIS — E03.9 HYPOTHYROIDISM, UNSPECIFIED TYPE: ICD-10-CM

## 2024-10-23 DIAGNOSIS — Z13.220 LIPID SCREENING: ICD-10-CM

## 2024-10-23 DIAGNOSIS — I48.11 LONGSTANDING PERSISTENT ATRIAL FIBRILLATION (MULTI): ICD-10-CM

## 2024-10-23 DIAGNOSIS — E11.9 TYPE 2 DIABETES MELLITUS WITHOUT COMPLICATION, WITHOUT LONG-TERM CURRENT USE OF INSULIN (MULTI): ICD-10-CM

## 2024-10-23 DIAGNOSIS — Z00.00 ENCOUNTER FOR ANNUAL WELLNESS EXAM IN MEDICARE PATIENT: ICD-10-CM

## 2024-10-23 DIAGNOSIS — K76.9 LIVER LESION: Primary | ICD-10-CM

## 2024-10-23 DIAGNOSIS — E78.5 HYPERLIPIDEMIA, UNSPECIFIED HYPERLIPIDEMIA TYPE: ICD-10-CM

## 2024-10-23 DIAGNOSIS — R91.1 LUNG NODULE: ICD-10-CM

## 2024-10-23 DIAGNOSIS — Z00.00 ROUTINE GENERAL MEDICAL EXAMINATION AT HEALTH CARE FACILITY: ICD-10-CM

## 2024-10-23 LAB
ALBUMIN SERPL BCP-MCNC: 4.1 G/DL (ref 3.4–5)
ALP SERPL-CCNC: 67 U/L (ref 33–136)
ALT SERPL W P-5'-P-CCNC: 43 U/L (ref 10–52)
ANION GAP SERPL CALC-SCNC: 14 MMOL/L (ref 10–20)
AST SERPL W P-5'-P-CCNC: 34 U/L (ref 9–39)
BILIRUB SERPL-MCNC: 1 MG/DL (ref 0–1.2)
BUN SERPL-MCNC: 18 MG/DL (ref 6–23)
CALCIUM SERPL-MCNC: 9.5 MG/DL (ref 8.6–10.6)
CHLORIDE SERPL-SCNC: 103 MMOL/L (ref 98–107)
CHOLEST SERPL-MCNC: 142 MG/DL (ref 0–199)
CHOLESTEROL/HDL RATIO: 2.7
CO2 SERPL-SCNC: 26 MMOL/L (ref 21–32)
CREAT SERPL-MCNC: 1.25 MG/DL (ref 0.5–1.3)
EGFRCR SERPLBLD CKD-EPI 2021: 62 ML/MIN/1.73M*2
ERYTHROCYTE [DISTWIDTH] IN BLOOD BY AUTOMATED COUNT: 13.6 % (ref 11.5–14.5)
EST. AVERAGE GLUCOSE BLD GHB EST-MCNC: 120 MG/DL
GLUCOSE SERPL-MCNC: 108 MG/DL (ref 74–99)
HBA1C MFR BLD: 5.8 %
HCT VFR BLD AUTO: 46.6 % (ref 41–52)
HDLC SERPL-MCNC: 52.1 MG/DL
HGB BLD-MCNC: 15.1 G/DL (ref 13.5–17.5)
LDLC SERPL CALC-MCNC: 67 MG/DL
MCH RBC QN AUTO: 32.7 PG (ref 26–34)
MCHC RBC AUTO-ENTMCNC: 32.4 G/DL (ref 32–36)
MCV RBC AUTO: 101 FL (ref 80–100)
NON HDL CHOLESTEROL: 90 MG/DL (ref 0–149)
NRBC BLD-RTO: 0 /100 WBCS (ref 0–0)
PLATELET # BLD AUTO: 144 X10*3/UL (ref 150–450)
POTASSIUM SERPL-SCNC: 4.3 MMOL/L (ref 3.5–5.3)
PROT SERPL-MCNC: 6.8 G/DL (ref 6.4–8.2)
PSA SERPL-MCNC: 0.29 NG/ML
RBC # BLD AUTO: 4.62 X10*6/UL (ref 4.5–5.9)
SODIUM SERPL-SCNC: 139 MMOL/L (ref 136–145)
TRIGL SERPL-MCNC: 116 MG/DL (ref 0–149)
TSH SERPL-ACNC: 3.34 MIU/L (ref 0.44–3.98)
VLDL: 23 MG/DL (ref 0–40)
WBC # BLD AUTO: 8 X10*3/UL (ref 4.4–11.3)

## 2024-10-23 PROCEDURE — 3008F BODY MASS INDEX DOCD: CPT | Performed by: STUDENT IN AN ORGANIZED HEALTH CARE EDUCATION/TRAINING PROGRAM

## 2024-10-23 PROCEDURE — 85027 COMPLETE CBC AUTOMATED: CPT

## 2024-10-23 PROCEDURE — 3044F HG A1C LEVEL LT 7.0%: CPT | Performed by: STUDENT IN AN ORGANIZED HEALTH CARE EDUCATION/TRAINING PROGRAM

## 2024-10-23 PROCEDURE — G0439 PPPS, SUBSEQ VISIT: HCPCS | Performed by: STUDENT IN AN ORGANIZED HEALTH CARE EDUCATION/TRAINING PROGRAM

## 2024-10-23 PROCEDURE — 3074F SYST BP LT 130 MM HG: CPT | Performed by: STUDENT IN AN ORGANIZED HEALTH CARE EDUCATION/TRAINING PROGRAM

## 2024-10-23 PROCEDURE — 80053 COMPREHEN METABOLIC PANEL: CPT

## 2024-10-23 PROCEDURE — 1159F MED LIST DOCD IN RCRD: CPT | Performed by: STUDENT IN AN ORGANIZED HEALTH CARE EDUCATION/TRAINING PROGRAM

## 2024-10-23 PROCEDURE — 84443 ASSAY THYROID STIM HORMONE: CPT

## 2024-10-23 PROCEDURE — 1160F RVW MEDS BY RX/DR IN RCRD: CPT | Performed by: STUDENT IN AN ORGANIZED HEALTH CARE EDUCATION/TRAINING PROGRAM

## 2024-10-23 PROCEDURE — 36415 COLL VENOUS BLD VENIPUNCTURE: CPT

## 2024-10-23 PROCEDURE — 99214 OFFICE O/P EST MOD 30 MIN: CPT | Performed by: STUDENT IN AN ORGANIZED HEALTH CARE EDUCATION/TRAINING PROGRAM

## 2024-10-23 PROCEDURE — 80061 LIPID PANEL: CPT

## 2024-10-23 PROCEDURE — 1170F FXNL STATUS ASSESSED: CPT | Performed by: STUDENT IN AN ORGANIZED HEALTH CARE EDUCATION/TRAINING PROGRAM

## 2024-10-23 PROCEDURE — G0103 PSA SCREENING: HCPCS

## 2024-10-23 PROCEDURE — 83036 HEMOGLOBIN GLYCOSYLATED A1C: CPT

## 2024-10-23 PROCEDURE — 3079F DIAST BP 80-89 MM HG: CPT | Performed by: STUDENT IN AN ORGANIZED HEALTH CARE EDUCATION/TRAINING PROGRAM

## 2024-10-23 ASSESSMENT — ACTIVITIES OF DAILY LIVING (ADL)
GROCERY_SHOPPING: INDEPENDENT
BATHING: INDEPENDENT
TAKING_MEDICATION: INDEPENDENT
DOING_HOUSEWORK: INDEPENDENT
MANAGING_FINANCES: INDEPENDENT
DRESSING: INDEPENDENT

## 2024-10-23 ASSESSMENT — ENCOUNTER SYMPTOMS
OCCASIONAL FEELINGS OF UNSTEADINESS: 0
DEPRESSION: 0
LOSS OF SENSATION IN FEET: 1

## 2024-10-23 NOTE — PROGRESS NOTES
"Subjective   Patient ID: Abbe Boucher is a 69 y.o. male who presents for Medicare Annual Wellness Visit Subsequent.    [facility]   Routine fu and Wellness exam.    He feels well in general.    He wants to review recent liver imaging and CT chest.    He is using small amount of marijuana gummi at night to help with shoulder pain and sleep, finds this helpful. Does not use it every night.  Review of Systems  12-point ROS reviewed and was negative unless otherwise noted in HPI.    Objective   /85   Ht 1.88 m (6' 2\")   Wt 127 kg (280 lb)   BMI 35.95 kg/m²     Physical Exam  GEN: conversant, NAD  HEENT: PERRL, EOMI, MMM  NECK: supple, no carotid bruits appreciated b/l  CV: S1, S2, RRR  PULM: CTAB  ABD: soft, NT, ND  NEURO: no new gross focal deficits  EXT: no sig LE edema  PSYCH: appropriate affect    Assessment/Plan     #Lung nodule: repeat CT chest 7/2025. Recent CT reviewed with patient.     #HLD/CAD: continue atorvastatin 20mg daily, check lipids     #H/o VT arrest s/p ICD  Follows with EP      #Permanent A. fib  Well-controlled  Following with cardiology     #Diabetes, type 2: Continue Ozempic 0.5mg, discussed healthy lifestyle habits.  Seeing endocrinology. Recommended diabetic shoes/inserts     #Hypothyroidism: Continue levothyroxine, check TSH     #BPH - continue tamsulosin     #Neuropathy: chronic, continue gabapentin     #Health maintenance: he says that he had a colonoscopy in 2022, no polyps. Advised Shingrix, RSV vaccine, and yearly flu shot. Received COVID-19 vaccines. Received Pneumovax 23. He declines TDaP. Longitudinal care.     RTC 3 months     "

## 2024-10-24 ENCOUNTER — HOSPITAL ENCOUNTER (OUTPATIENT)
Dept: CARDIOLOGY | Facility: CLINIC | Age: 69
Discharge: HOME | End: 2024-10-24
Payer: MEDICARE

## 2024-10-24 DIAGNOSIS — I49.01 VENTRICULAR FIBRILLATION (MULTI): ICD-10-CM

## 2024-10-24 PROCEDURE — 93282 PRGRMG EVAL IMPLANTABLE DFB: CPT

## 2024-10-31 ENCOUNTER — LAB (OUTPATIENT)
Dept: LAB | Facility: LAB | Age: 69
End: 2024-10-31
Payer: MEDICARE

## 2024-10-31 DIAGNOSIS — E11.9 TYPE 2 DIABETES MELLITUS WITHOUT COMPLICATIONS (MULTI): Primary | ICD-10-CM

## 2024-10-31 DIAGNOSIS — E78.5 HYPERLIPIDEMIA, UNSPECIFIED: ICD-10-CM

## 2024-10-31 LAB
CHOLEST SERPL-MCNC: 132 MG/DL (ref 0–199)
CHOLESTEROL/HDL RATIO: 2.9
CREAT UR-MCNC: 121.8 MG/DL (ref 20–370)
HDLC SERPL-MCNC: 45.2 MG/DL
LDLC SERPL CALC-MCNC: 63 MG/DL
MICROALBUMIN UR-MCNC: 7.1 MG/L
MICROALBUMIN/CREAT UR: 5.8 UG/MG CREAT
NON HDL CHOLESTEROL: 87 MG/DL (ref 0–149)
TRIGL SERPL-MCNC: 120 MG/DL (ref 0–149)
VLDL: 24 MG/DL (ref 0–40)

## 2024-10-31 PROCEDURE — 36415 COLL VENOUS BLD VENIPUNCTURE: CPT

## 2024-10-31 PROCEDURE — 82570 ASSAY OF URINE CREATININE: CPT

## 2024-10-31 PROCEDURE — 80061 LIPID PANEL: CPT

## 2024-10-31 PROCEDURE — 82043 UR ALBUMIN QUANTITATIVE: CPT

## 2024-11-14 DIAGNOSIS — I48.11 LONGSTANDING PERSISTENT ATRIAL FIBRILLATION (MULTI): ICD-10-CM

## 2024-11-14 RX ORDER — METOPROLOL SUCCINATE 100 MG/1
50 TABLET, EXTENDED RELEASE ORAL 2 TIMES DAILY
COMMUNITY
Start: 2024-11-14

## 2024-11-14 NOTE — TELEPHONE ENCOUNTER
Patient called to report that Dr. Santizo decreased his Metoprolol from 100mg bid to 50mg bid.    Spoke with patient stated his bp was really low and he was experiencing dizziness. Could not recall bp numbers.    Wt loss 20# since following with Endocrinology.

## 2024-12-11 DIAGNOSIS — E78.5 HYPERLIPIDEMIA, UNSPECIFIED HYPERLIPIDEMIA TYPE: ICD-10-CM

## 2024-12-11 RX ORDER — ATORVASTATIN CALCIUM 20 MG/1
20 TABLET, FILM COATED ORAL DAILY
Qty: 90 TABLET | Refills: 1 | Status: SHIPPED | OUTPATIENT
Start: 2024-12-11

## 2025-01-23 ENCOUNTER — HOSPITAL ENCOUNTER (OUTPATIENT)
Dept: CARDIOLOGY | Facility: CLINIC | Age: 70
Discharge: HOME | End: 2025-01-23
Payer: MEDICARE

## 2025-01-23 DIAGNOSIS — I49.01 VENTRICULAR FIBRILLATION (MULTI): ICD-10-CM

## 2025-01-23 DIAGNOSIS — Z95.810 PRESENCE OF AUTOMATIC CARDIOVERTER/DEFIBRILLATOR (AICD): ICD-10-CM

## 2025-01-23 PROCEDURE — 93296 REM INTERROG EVL PM/IDS: CPT

## 2025-01-23 PROCEDURE — 93295 DEV INTERROG REMOTE 1/2/MLT: CPT | Performed by: INTERNAL MEDICINE

## 2025-02-05 ENCOUNTER — APPOINTMENT (OUTPATIENT)
Dept: PRIMARY CARE | Facility: CLINIC | Age: 70
End: 2025-02-05
Payer: MEDICARE

## 2025-02-18 ENCOUNTER — OFFICE VISIT (OUTPATIENT)
Dept: ORTHOPEDIC SURGERY | Facility: CLINIC | Age: 70
End: 2025-02-18
Payer: MEDICARE

## 2025-02-18 ENCOUNTER — HOSPITAL ENCOUNTER (OUTPATIENT)
Dept: RADIOLOGY | Facility: CLINIC | Age: 70
Discharge: HOME | End: 2025-02-18
Payer: MEDICARE

## 2025-02-18 VITALS — BODY MASS INDEX: 35.04 KG/M2 | WEIGHT: 273 LBS | HEIGHT: 74 IN

## 2025-02-18 DIAGNOSIS — G89.29 CHRONIC RIGHT SHOULDER PAIN: ICD-10-CM

## 2025-02-18 DIAGNOSIS — M25.511 CHRONIC RIGHT SHOULDER PAIN: ICD-10-CM

## 2025-02-18 DIAGNOSIS — M75.81 ROTATOR CUFF TENDINITIS, RIGHT: Primary | ICD-10-CM

## 2025-02-18 PROCEDURE — 1036F TOBACCO NON-USER: CPT | Performed by: ORTHOPAEDIC SURGERY

## 2025-02-18 PROCEDURE — 73030 X-RAY EXAM OF SHOULDER: CPT | Mod: RIGHT SIDE | Performed by: RADIOLOGY

## 2025-02-18 PROCEDURE — 20610 DRAIN/INJ JOINT/BURSA W/O US: CPT | Mod: RT | Performed by: ORTHOPAEDIC SURGERY

## 2025-02-18 PROCEDURE — 1159F MED LIST DOCD IN RCRD: CPT | Performed by: ORTHOPAEDIC SURGERY

## 2025-02-18 PROCEDURE — 99213 OFFICE O/P EST LOW 20 MIN: CPT | Performed by: ORTHOPAEDIC SURGERY

## 2025-02-18 PROCEDURE — 3008F BODY MASS INDEX DOCD: CPT | Performed by: ORTHOPAEDIC SURGERY

## 2025-02-18 PROCEDURE — 1160F RVW MEDS BY RX/DR IN RCRD: CPT | Performed by: ORTHOPAEDIC SURGERY

## 2025-02-18 PROCEDURE — 99213 OFFICE O/P EST LOW 20 MIN: CPT | Mod: 25 | Performed by: ORTHOPAEDIC SURGERY

## 2025-02-18 PROCEDURE — 73030 X-RAY EXAM OF SHOULDER: CPT | Mod: RT

## 2025-02-18 PROCEDURE — 2500000004 HC RX 250 GENERAL PHARMACY W/ HCPCS (ALT 636 FOR OP/ED): Performed by: ORTHOPAEDIC SURGERY

## 2025-02-18 RX ORDER — METOPROLOL SUCCINATE 50 MG/1
1 TABLET, EXTENDED RELEASE ORAL
COMMUNITY
Start: 2025-02-04

## 2025-02-18 RX ORDER — TRIAMCINOLONE ACETONIDE 40 MG/ML
40 INJECTION, SUSPENSION INTRA-ARTICULAR; INTRAMUSCULAR
Status: COMPLETED | OUTPATIENT
Start: 2025-02-18 | End: 2025-02-18

## 2025-02-18 RX ORDER — LIDOCAINE HYDROCHLORIDE 10 MG/ML
2 INJECTION, SOLUTION INFILTRATION; PERINEURAL
Status: COMPLETED | OUTPATIENT
Start: 2025-02-18 | End: 2025-02-18

## 2025-02-18 RX ADMIN — TRIAMCINOLONE ACETONIDE 40 MG: 40 INJECTION, SUSPENSION INTRA-ARTICULAR; INTRAMUSCULAR at 11:24

## 2025-02-18 RX ADMIN — LIDOCAINE HYDROCHLORIDE 2 ML: 10 INJECTION, SOLUTION INFILTRATION; PERINEURAL at 11:24

## 2025-02-18 NOTE — PROGRESS NOTES
69-year-old is seen with right shoulder pain.  Has been having intermittent moderate throbbing discomfort in the right shoulder.  Pain is worse with overhead reaching and lifting activities.  He had good relief with a cortisone injection in July.  No new traumatic event.    Pleasant and no acute distress.  Right shoulder forward flexion 160°. No effusion or instability. There is positive Neer and Porras impingement. There is subacromial crepitus and tenderness around the subacromial space. No biceps tenderness. No acromioclavicular tenderness. Rotator cuff strength is intact but there is discomfort with strength testing. Left shoulder forward flexion 180°. No effusion or instability. No impingement or crepitus or tenderness involving the subacromial space. No biceps or acromioclavicular tenderness. There is intact rotator cuff strength. There is adequate range of motion of the cervical spine without pain. Both upper extremities are well perfused, skin is intact and muscle tone is adequate. Elbow flexion and extension and wrist flexion and extension strength are intact.    A discussion about his shoulder was done.  Discussion about rotator cuff problems was done.  Decision was made to repeat the cortisone injection.  He will ice or heat and use Tylenol and avoid aggravating activities.  If he had persistent symptoms MRI could be obtained if his defibrillator allows for this otherwise ultrasound could be done.    L Inj/Asp: R subacromial bursa on 2/18/2025 11:24 AM  Indications: pain  Details: 22 G needle, posterior approach  Medications: 40 mg triamcinolone acetonide 40 mg/mL; 2 mL lidocaine 10 mg/mL (1 %)  Procedure, treatment alternatives, risks and benefits explained, specific risks discussed. Consent was given by the patient.

## 2025-02-19 DIAGNOSIS — I48.11 LONGSTANDING PERSISTENT ATRIAL FIBRILLATION (MULTI): ICD-10-CM

## 2025-03-24 DIAGNOSIS — G62.9 NEUROPATHY: ICD-10-CM

## 2025-03-24 DIAGNOSIS — E78.5 HYPERLIPIDEMIA, UNSPECIFIED HYPERLIPIDEMIA TYPE: ICD-10-CM

## 2025-03-24 DIAGNOSIS — N40.0 BENIGN PROSTATIC HYPERPLASIA, UNSPECIFIED WHETHER LOWER URINARY TRACT SYMPTOMS PRESENT: ICD-10-CM

## 2025-03-24 DIAGNOSIS — I10 PRIMARY HYPERTENSION: Primary | ICD-10-CM

## 2025-03-24 DIAGNOSIS — I48.11 LONGSTANDING PERSISTENT ATRIAL FIBRILLATION (MULTI): ICD-10-CM

## 2025-03-24 DIAGNOSIS — E03.9 HYPOTHYROIDISM, UNSPECIFIED TYPE: ICD-10-CM

## 2025-03-24 RX ORDER — METOPROLOL SUCCINATE 50 MG/1
50 TABLET, EXTENDED RELEASE ORAL
Qty: 180 TABLET | Refills: 1 | Status: SHIPPED | OUTPATIENT
Start: 2025-03-24

## 2025-03-24 RX ORDER — LEVOTHYROXINE SODIUM 50 UG/1
50 TABLET ORAL DAILY
Qty: 90 TABLET | Refills: 1 | Status: SHIPPED | OUTPATIENT
Start: 2025-03-24 | End: 2025-09-20

## 2025-03-24 RX ORDER — GABAPENTIN 300 MG/1
300 CAPSULE ORAL 3 TIMES DAILY
Qty: 270 CAPSULE | Refills: 1 | Status: SHIPPED | OUTPATIENT
Start: 2025-03-24 | End: 2025-09-20

## 2025-03-24 RX ORDER — TAMSULOSIN HYDROCHLORIDE 0.4 MG/1
0.4 CAPSULE ORAL NIGHTLY
Qty: 90 CAPSULE | Refills: 1 | Status: SHIPPED | OUTPATIENT
Start: 2025-03-24

## 2025-03-24 RX ORDER — ATORVASTATIN CALCIUM 20 MG/1
20 TABLET, FILM COATED ORAL DAILY
Qty: 90 TABLET | Refills: 1 | Status: SHIPPED | OUTPATIENT
Start: 2025-03-24

## 2025-04-24 ENCOUNTER — APPOINTMENT (OUTPATIENT)
Dept: PRIMARY CARE | Facility: CLINIC | Age: 70
End: 2025-04-24
Payer: MEDICARE

## 2025-04-24 ENCOUNTER — APPOINTMENT (OUTPATIENT)
Dept: CARDIOLOGY | Facility: CLINIC | Age: 70
End: 2025-04-24
Payer: MEDICARE

## 2025-04-24 VITALS
SYSTOLIC BLOOD PRESSURE: 140 MMHG | BODY MASS INDEX: 34.01 KG/M2 | WEIGHT: 265 LBS | DIASTOLIC BLOOD PRESSURE: 78 MMHG | HEIGHT: 74 IN

## 2025-04-24 DIAGNOSIS — I48.11 LONGSTANDING PERSISTENT ATRIAL FIBRILLATION (MULTI): ICD-10-CM

## 2025-04-24 DIAGNOSIS — E78.5 HYPERLIPIDEMIA, UNSPECIFIED HYPERLIPIDEMIA TYPE: ICD-10-CM

## 2025-04-24 DIAGNOSIS — E03.9 HYPOTHYROIDISM, UNSPECIFIED TYPE: ICD-10-CM

## 2025-04-24 DIAGNOSIS — R91.1 LUNG NODULE: Primary | ICD-10-CM

## 2025-04-24 DIAGNOSIS — E11.9 TYPE 2 DIABETES MELLITUS WITHOUT COMPLICATION, WITHOUT LONG-TERM CURRENT USE OF INSULIN: ICD-10-CM

## 2025-04-24 PROCEDURE — 3077F SYST BP >= 140 MM HG: CPT | Performed by: STUDENT IN AN ORGANIZED HEALTH CARE EDUCATION/TRAINING PROGRAM

## 2025-04-24 PROCEDURE — 99213 OFFICE O/P EST LOW 20 MIN: CPT | Performed by: STUDENT IN AN ORGANIZED HEALTH CARE EDUCATION/TRAINING PROGRAM

## 2025-04-24 PROCEDURE — 3008F BODY MASS INDEX DOCD: CPT | Performed by: STUDENT IN AN ORGANIZED HEALTH CARE EDUCATION/TRAINING PROGRAM

## 2025-04-24 PROCEDURE — G2211 COMPLEX E/M VISIT ADD ON: HCPCS | Performed by: STUDENT IN AN ORGANIZED HEALTH CARE EDUCATION/TRAINING PROGRAM

## 2025-04-24 PROCEDURE — 1036F TOBACCO NON-USER: CPT | Performed by: STUDENT IN AN ORGANIZED HEALTH CARE EDUCATION/TRAINING PROGRAM

## 2025-04-24 PROCEDURE — 3078F DIAST BP <80 MM HG: CPT | Performed by: STUDENT IN AN ORGANIZED HEALTH CARE EDUCATION/TRAINING PROGRAM

## 2025-04-24 NOTE — PROGRESS NOTES
"Subjective   Patient ID: Abbe Boucher is a 70 y.o. male who presents for Follow-up.    HPI   Routine fu.    Thinks that he has some sinus drainage and a cough but isnt too concerned. Denies all sick symptoms. Thinks may be attributed to allergies.    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    Review of Systems  A 10 point ROS was performed with the patient denying any complaint at this time aside from those listed in the HPI above.     Objective   /78   Ht 1.88 m (6' 2\")   Wt 120 kg (265 lb)   BMI 34.02 kg/m²     Physical Exam  GEN: conversant, NAD  HEENT: PERRL, EOMI  CV: S1, S2, RRR  PULM: CTAB  ABD: soft, NT, ND  NEURO: no new gross focal deficits  EXT: no sig LE edema  PSYCH: appropriate affect    Assessment/Plan       #Permanent A. Fib  -Cards dec his metoprolol succinate to 50mg BID due to low BPs  -Well-controlled  -Following with cardiology (Jessica)  -Following with EP (Rammicone)    #Lung nodule: repeat CT chest 7/2025, ordered this visit.     #HLD/CAD: continue atorvastatin 20mg daily, check lipids     #H/o VT arrest s/p ICD  Follows with EP      #Diabetes, type 2: last A1c 5.8% 10/2024. Continue Ozempic 0.5mg, discussed healthy lifestyle habits.  Seeing endocrinology (Darlyn). Recommended diabetic shoes/inserts     #Hypothyroidism: Continue levothyroxine, check TSH     #BPH - continue tamsulosin     #Neuropathy: chronic, continue gabapentin     #Health maintenance: he says that he had a colonoscopy in 2022, no polyps. Advised Shingrix, RSV vaccine, and yearly flu shot. Received COVID-19 vaccines. Received Pneumovax 23. He declines TDaP. Longitudinal care.     RTC 6 months    Saw Aleman DO  PGY-2, Internal Medicine    Trainee role: Resident    I saw and evaluated the patient. I personally obtained the key and critical portions of the history and physical exam or was physically present for key and critical portions " performed by the trainee. I reviewed the trainee's documentation and discussed the patient with the trainee. I agree with the trainee's medical decision making as documented on the trainee's notes.    Aleks Sarkar M.D.

## 2025-04-29 ENCOUNTER — HOSPITAL ENCOUNTER (OUTPATIENT)
Dept: CARDIOLOGY | Facility: CLINIC | Age: 70
Discharge: HOME | End: 2025-04-29
Payer: MEDICARE

## 2025-04-29 DIAGNOSIS — I49.01 VENTRICULAR FIBRILLATION (MULTI): ICD-10-CM

## 2025-04-29 DIAGNOSIS — Z95.810 PRESENCE OF AUTOMATIC CARDIOVERTER/DEFIBRILLATOR (AICD): ICD-10-CM

## 2025-04-29 PROCEDURE — 93296 REM INTERROG EVL PM/IDS: CPT

## 2025-05-05 ENCOUNTER — OFFICE VISIT (OUTPATIENT)
Dept: PRIMARY CARE | Facility: CLINIC | Age: 70
End: 2025-05-05
Payer: MEDICARE

## 2025-05-05 VITALS
OXYGEN SATURATION: 95 % | BODY MASS INDEX: 34.02 KG/M2 | WEIGHT: 265 LBS | HEART RATE: 74 BPM | DIASTOLIC BLOOD PRESSURE: 65 MMHG | SYSTOLIC BLOOD PRESSURE: 103 MMHG

## 2025-05-05 DIAGNOSIS — J01.90 ACUTE BACTERIAL SINUSITIS: Primary | ICD-10-CM

## 2025-05-05 DIAGNOSIS — H10.9 BACTERIAL CONJUNCTIVITIS: ICD-10-CM

## 2025-05-05 DIAGNOSIS — B96.89 ACUTE BACTERIAL SINUSITIS: Primary | ICD-10-CM

## 2025-05-05 PROCEDURE — 1036F TOBACCO NON-USER: CPT | Performed by: INTERNAL MEDICINE

## 2025-05-05 PROCEDURE — 99214 OFFICE O/P EST MOD 30 MIN: CPT | Performed by: INTERNAL MEDICINE

## 2025-05-05 PROCEDURE — 3078F DIAST BP <80 MM HG: CPT | Performed by: INTERNAL MEDICINE

## 2025-05-05 PROCEDURE — 1159F MED LIST DOCD IN RCRD: CPT | Performed by: INTERNAL MEDICINE

## 2025-05-05 PROCEDURE — 1160F RVW MEDS BY RX/DR IN RCRD: CPT | Performed by: INTERNAL MEDICINE

## 2025-05-05 PROCEDURE — 1123F ACP DISCUSS/DSCN MKR DOCD: CPT | Performed by: INTERNAL MEDICINE

## 2025-05-05 PROCEDURE — 3074F SYST BP LT 130 MM HG: CPT | Performed by: INTERNAL MEDICINE

## 2025-05-05 PROCEDURE — 1158F ADVNC CARE PLAN TLK DOCD: CPT | Performed by: INTERNAL MEDICINE

## 2025-05-05 RX ORDER — AZITHROMYCIN 250 MG/1
TABLET, FILM COATED ORAL
Qty: 6 TABLET | Refills: 1 | Status: SHIPPED | OUTPATIENT
Start: 2025-05-05 | End: 2025-05-10

## 2025-05-05 ASSESSMENT — PATIENT HEALTH QUESTIONNAIRE - PHQ9
2. FEELING DOWN, DEPRESSED OR HOPELESS: NOT AT ALL
1. LITTLE INTEREST OR PLEASURE IN DOING THINGS: NOT AT ALL
SUM OF ALL RESPONSES TO PHQ9 QUESTIONS 1 AND 2: 0

## 2025-05-05 NOTE — PROGRESS NOTES
"Subjective   Patient ID: Aleks Boucher \"Abbe\" is a 70 y.o. male who presents for LEFT EYE TEARING WITH STICKINESS (2XDAYS).  HPI        Past medical history of ventricular fibrillation status post AICD type 2 diabetes hypertension hyperlipidemia history of mitral valve repair DVT hypothyroid BPH atrial fibrillation    Patient describes 2 days of left eye drainage pus grade 8 out of 10 maybe worse with exposure to 4-year-old grandson nothing makes better he states he has an old ciprofloxacin eyedrop at home he has not used  Some sensitivity of the left eye  Sinus congestion for 1.5 weeks  Productive cough green to brown  Denies vision loss diplopia fever chills headaches nausea vomiting chest pain dyspnea otalgia otorrhea        Health Maintenance:      Colonoscopy:      Mammogram:      Pelvic/Pap:      Low dose chest CT:      Aorta duplex:      Optho:      Podiatry:        Vaccines:      Refer to Epic Vaccination Log        ROS:      General: denies fever/chills/weight loss      Head: Patient was having dizziness this got better after decreasing the dose of metoprolol ; sinus congestion denies HA/trauma/masses/dizziness      Eyes: Pus drainage from left eye denies vision change/loss of vision/blurry vision/diplopia/eye pain      Ears: denies hearing loss/tinnitus/otalgia/otorrhea      Nose: denies nasal drainage/anosmia      Throat: denies dysphagia/odynophagia      Lymphatics: denies lymph node swelling      Breast: denies masses/discharge/dimpling/skin changes      Cardiac: denies CP/palpitations/orthopnea/PND      Pulmonary: Productive cough green-brown denies dyspnea/cough/wheezing      GI: denies abd pain/n/v/diarrhea/melena/hematochezia/hematemesis      : denies dysuria/hematuria/change frequency      Genital: denies genital discharge/lesions      Skin: denies rashes/lesions/masses      MSK: denies weakness/swelling/edema/gait imbalance/pain      Neuro: denies paresthesias/seizures/dysarthria      Psych: " "denies depression/anxiety/suicidal or homicidal ideations            Objective   /65   Pulse 74   Wt 120 kg (265 lb)   SpO2 95%   BMI 34.02 kg/m²      Physical Exam:     General: AO3, NAD     Head: atraumatic/NC     Eyes: Purulent drainage left eye moderate conjunctival erythema 20/20 OU EOMI/PERRLA. Negative APD     Ears: TM pearly gray, EAC clear. No lesions or erythema     Nose: symmetric nares, no discharge     Throat: trachea midline, uvula midline pink mucosa. No thyromegaly     Lymphatics: no cervical/supraclavicular/ant or posterior cervical adenopathy/axillary/inguinal adenopathy     Breast: not examined     Chest: no deformity or tenderness to palpation     Pulm: CTA b/l, no wheeze/rhonchi/rales. nonlabored     Cardiac: RRR +s1s2, no m/r/g.      GI: soft, NT/ND. Normoactive Bsx4. No rebound/guarding.     Rectal: not examined     Genital: not examined     MSK: 5/5 strength UE LE. No edema/clubbing/cyanosis     Skin: no rashes/lesions     Vascular: 2+ palp DP PT radials b/l. Negative carotid bruit     Neuro: CNII-XII intact. No focal deficits. Reflexes 2/4 brachioradialis bicep tricep patellar achilles. Finger to nose intact.     Psych: appropriate mood/affect                    No results found for: \"BMPR1A\", \"CBCDIF\"      Assessment/Plan   Diagnoses and all orders for this visit:  Acute bacterial sinusitis  -     azithromycin (Zithromax) 250 mg tablet; Take 2 tablets (500 mg) by mouth once daily for 1 day, THEN 1 tablet (250 mg) once daily for 4 days. Take 2 tabs (500 mg) by mouth today, than 1 daily for 4 days.  Bacterial conjunctivitis    Increase clear fluids rest steam from the shower  Uses ciprofloxacin to bilateral eyes as ordered on the packaging at home for 1 week  Go to the ER for chest pain shortness of breath vision loss or severe eye pain    Thank you for make an appointment today Abbe     Please follow-up as previously scheduled with PCP Dr. Sarkar in 6 months    Aditya Hand, DO, " FACOI       Aditya Hand, DO

## 2025-05-21 ENCOUNTER — APPOINTMENT (OUTPATIENT)
Dept: DERMATOLOGY | Facility: CLINIC | Age: 70
End: 2025-05-21
Payer: MEDICARE

## 2025-06-11 ENCOUNTER — APPOINTMENT (OUTPATIENT)
Dept: DERMATOLOGY | Facility: CLINIC | Age: 70
End: 2025-06-11
Payer: MEDICARE

## 2025-06-11 DIAGNOSIS — B35.3 TINEA PEDIS OF LEFT FOOT: Primary | ICD-10-CM

## 2025-06-11 DIAGNOSIS — L81.4 LENTIGO: ICD-10-CM

## 2025-06-11 DIAGNOSIS — L57.8 PHOTOAGING OF SKIN: ICD-10-CM

## 2025-06-11 DIAGNOSIS — Z12.83 ENCOUNTER FOR SCREENING FOR MALIGNANT NEOPLASM OF SKIN: ICD-10-CM

## 2025-06-11 DIAGNOSIS — D22.5 MELANOCYTIC NEVI OF TRUNK: ICD-10-CM

## 2025-06-11 DIAGNOSIS — D22.39 FIBROUS PAPULE OF NOSE: ICD-10-CM

## 2025-06-11 DIAGNOSIS — D18.01 HEMANGIOMA OF SKIN: ICD-10-CM

## 2025-06-11 DIAGNOSIS — D23.9 DERMATOFIBROMA: ICD-10-CM

## 2025-06-11 DIAGNOSIS — Z85.828 PERSONAL HISTORY OF OTHER MALIGNANT NEOPLASM OF SKIN: ICD-10-CM

## 2025-06-11 DIAGNOSIS — L91.8 SKIN TAG: ICD-10-CM

## 2025-06-11 DIAGNOSIS — L72.0 MILIA: ICD-10-CM

## 2025-06-11 PROCEDURE — 1159F MED LIST DOCD IN RCRD: CPT | Performed by: DERMATOLOGY

## 2025-06-11 PROCEDURE — 99214 OFFICE O/P EST MOD 30 MIN: CPT | Performed by: DERMATOLOGY

## 2025-06-11 RX ORDER — KETOCONAZOLE 20 MG/G
CREAM TOPICAL
Qty: 60 G | Refills: 3 | Status: SHIPPED | OUTPATIENT
Start: 2025-06-11

## 2025-06-11 NOTE — Clinical Note
Benign growths that most commonly occur in areas of friction and rubbing, specifically the neck, axilla, and inguinal creases. No treatment is necessary. If lesions are symptomatic, they can be removed, however regardless of symptoms some insurances may not cover this procedure.

## 2025-06-11 NOTE — PROGRESS NOTES
"Kahlil Boucher \"Abbe\" is a 70 y.o. male who presents for the following: Skin Check (Annual - LV 05/15/24 - Patient has a history of: Keloid, BCC (?)  Area(s) of concern: inside right ear that has been present for apprx 6 months that is growing, no symptoms, no topicals, no past treatments.  Left wrists that appeared 1 month ago went away then returned 2 days ago; possible bruise from wristwatch, no symptoms, no topicals, no past treatments. Left heal has been flaky for apprx 1 mth, no symptoms, OTC lotions used with no improvement, no past treatments).          Review of Systems:  No other skin or systemic complaints other than what is documented elsewhere in the note.    The following portions of the chart were reviewed this encounter and updated as appropriate:         Skin Cancer History  Biopsy Log Book  No skin cancers from Specimen Tracking.    Additional History      Specialty Problems          Dermatology Problems    Actinic keratosis    Basal cell carcinoma of skin of right lower limb, including hip    Hemangioma of skin and subcutaneous tissue    Neoplasm of uncertain behavior of skin    Other benign neoplasm of skin of other parts of face    Other seborrheic keratosis        Objective   Well appearing patient in no apparent distress; mood and affect are within normal limits.    A full examination was performed including scalp, head, eyes, ears, nose, lips, neck, chest, axillae, abdomen, back, buttocks, bilateral upper extremities, bilateral lower extremities, hands, feet, fingers, toes, fingernails, and toenails. All findings within normal limits unless otherwise noted below.    Assessment/Plan   Skin Exam  1. TINEA PEDIS OF LEFT FOOT  Left Foot - Anterior  Left medial heal erythematous scaly patch and maceration of the left interdigital webspace  Tinea pedis is a fungal infection of the feet that can affect the sole, outside of the feet or in between the toes.   Fungal infections of the " feet are common due to increased sweating and/or moisture, which many fungi are attracted to.  It is often amenable to topical antifungal therapy, which is applied 2x daily for 1 month.  This does have a risk of recurrence and treatment can be repeated if it recurs.  If it does not respond to therapy and not improve after 3-4 weeks then a follow up visit for reassessment is recommended.    ketoconazole (NIZOral) 2 % cream - Left Foot - Anterior  Apply to the left foot 2x daily x 1 month, repeat if recurs  2. MILIA (2)  Left Breast, Right Antihelix  Small 1-2 mm white papules.  The benign nature of these skin lesions were reviewed, no treatment is necessary.   Please follow up for any new or pre-existing lesion that is changing in size, shape, color, becomes painful, tender, itches or bleed.  3. PERSONAL HISTORY OF OTHER MALIGNANT NEOPLASM OF SKIN  Right Lower Leg - Anterior  Well healed scar at site of prior treatment without evidence of recurrence.  There is no evidence of recurrence on clinical examination today, reassurance was provided to the patient. The importance of sun protection was reviewed with the patient including the use of a broad spectrum sunscreen that protects against both UVA/UVB rays, with ingredients such as Zinc oxide or titanium dioxide, wearing sun protective clothing and sun avoidance. Warning signs of non-melanoma skin cancer were reviewed. ABCDEs of melanoma reviewed. Patient to f/u should they notice any new or changing pre-existing skin lesion  4. PHOTOAGING OF SKIN  Generalized  Mottled pigmentation with telangiectasias and brown reticular macules in sun exposed areas of the body.   The risk of chronic, cumulative sun damage and risk of development of skin cancer was reviewed today.   The importance of sun protection was reviewed: including the use of a broad spectrum sunscreen that protects against both UVA/UVB rays, with ingredients such as Zinc oxide or titanium dioxide, wearing sun  protective clothing and sun avoidance. We reviewed the warning signs of non-melanoma skin cancer and ABCDEs of melanoma  Please follow up should you notice any new or changing pre-existing skin lesion.  Related Procedures  Follow Up In Dermatology - Established Patient  5. DERMATOFIBROMA (4)  Left Forearm - Anterior, Left Lower Leg - Anterior, Left Upper Back, Right Thigh - Posterior  Firm pink papule with hyperpigmented halo, +dimple sign  Benign, scar tissue like growth that most frequently is due to bug bite or ingrown hair. No treatment is necessary.  6. HEMANGIOMA OF SKIN  Generalized  Cherry red papules  The benign nature of these skin lesions were reviewed, no treatment is necessary.   Please follow up for any new or pre-existing lesion that is changing in size, shape, color, becomes painful, tender, itches or bleed.  7. LENTIGO  Generalized  Scattered tan macules in sun-exposed areas.  These are benign skin lesions due to sun exposure. They will darken in response to sun exposure. They should be monitored for change in size, shape or color.  These lesions can be treated cosmetically with topical creams, liquid nitrogen and a variety of lasers.  8. MELANOCYTIC NEVI OF TRUNK  Generalized  Tan-brown symmetric macules and papules  Clinically benign appearing nevi, no treatment is necessary.  The importance of sun protection was reviewed: including the use of a broad spectrum sunscreen that protects against both UVA/UVB rays, with ingredients such as Zinc oxide or titanium dioxide, wearing sun protective clothing and sun avoidance.   ABCDEs of melanoma reviewed.  Please follow up should you notice any new or changing pre-existing skin lesion.  9. FIBROUS PAPULE OF NOSE  Mid Tip of Nose  Pink papule  The benign nature of these skin lesions were reviewed, no treatment is necessary.   Please follow up for any new or pre-existing lesion that is changing in size, shape, color, becomes painful, tender, itches or  bleed.  10. SKIN TAG (2)  Left Axilla, Right Axilla  Fleshy, skin-colored sessile and pedunculated papules.   Benign growths that most commonly occur in areas of friction and rubbing, specifically the neck, axilla, and inguinal creases. No treatment is necessary. If lesions are symptomatic, they can be removed, however regardless of symptoms some insurances may not cover this procedure.  11. ENCOUNTER FOR SCREENING FOR MALIGNANT NEOPLASM OF SKIN  Generalized  No suspicious lesions noted on examination today   The risk of chronic, cumulative sun damage and risk of development of skin cancer was reviewed today.   The importance of sun protection was reviewed: including the use of a broad spectrum sunscreen that protects against both UVA/UVB rays, with ingredients such as Zinc oxide or titanium dioxide, wearing sun protective clothing and sun avoidance. We reviewed the warning signs of non-melanoma skin cancer and ABCDEs of melanoma  Please follow up should you notice any new or changing pre-existing skin lesion.  Related Procedures  Follow Up In Dermatology - Established Patient    Follow up in 1 year for FBSE

## 2025-06-11 NOTE — Clinical Note
Tinea pedis is a fungal infection of the feet that can affect the sole, outside of the feet or in between the toes.   Fungal infections of the feet are common due to increased sweating and/or moisture, which many fungi are attracted to.  It is often amenable to topical antifungal therapy, which is applied 2x daily for 1 month.  This does have a risk of recurrence and treatment can be repeated if it recurs.  If it does not respond to therapy and not improve after 3-4 weeks then a follow up visit for reassessment is recommended.

## 2025-07-22 ENCOUNTER — APPOINTMENT (OUTPATIENT)
Dept: RADIOLOGY | Facility: CLINIC | Age: 70
End: 2025-07-22
Payer: MEDICARE

## 2025-07-22 DIAGNOSIS — R91.1 LUNG NODULE: ICD-10-CM

## 2025-07-22 PROCEDURE — 71250 CT THORAX DX C-: CPT

## 2025-07-22 PROCEDURE — 76380 CAT SCAN FOLLOW-UP STUDY: CPT | Performed by: STUDENT IN AN ORGANIZED HEALTH CARE EDUCATION/TRAINING PROGRAM

## 2025-07-24 ENCOUNTER — APPOINTMENT (OUTPATIENT)
Dept: CARDIOLOGY | Facility: CLINIC | Age: 70
End: 2025-07-24
Payer: MEDICARE

## 2025-07-29 ENCOUNTER — HOSPITAL ENCOUNTER (OUTPATIENT)
Dept: CARDIOLOGY | Facility: CLINIC | Age: 70
Discharge: HOME | End: 2025-07-29
Payer: MEDICARE

## 2025-07-29 DIAGNOSIS — I49.01 VENTRICULAR FIBRILLATION (MULTI): ICD-10-CM

## 2025-07-29 DIAGNOSIS — Z95.810 PRESENCE OF AUTOMATIC CARDIOVERTER/DEFIBRILLATOR (AICD): ICD-10-CM

## 2025-07-29 PROCEDURE — 93295 DEV INTERROG REMOTE 1/2/MLT: CPT | Performed by: INTERNAL MEDICINE

## 2025-07-29 PROCEDURE — 93296 REM INTERROG EVL PM/IDS: CPT

## 2025-07-30 DIAGNOSIS — I49.01 VENTRICULAR FIBRILLATION (MULTI): ICD-10-CM

## 2025-07-30 DIAGNOSIS — Z95.810 PRESENCE OF AUTOMATIC CARDIOVERTER/DEFIBRILLATOR (AICD): ICD-10-CM

## 2025-08-01 PROBLEM — M75.82 TENDINITIS OF BOTH ROTATOR CUFFS: Status: RESOLVED | Noted: 2023-11-10 | Resolved: 2025-08-01

## 2025-08-01 PROBLEM — M75.81 TENDINITIS OF BOTH ROTATOR CUFFS: Status: RESOLVED | Noted: 2023-11-10 | Resolved: 2025-08-01

## 2025-08-01 PROBLEM — E03.9 HYPOTHYROIDISM: Status: ACTIVE | Noted: 2023-04-05

## 2025-08-01 PROBLEM — D48.5 NEOPLASM OF UNCERTAIN BEHAVIOR OF SKIN: Status: RESOLVED | Noted: 2023-06-21 | Resolved: 2025-08-01

## 2025-08-20 ENCOUNTER — APPOINTMENT (OUTPATIENT)
Dept: CARDIOLOGY | Facility: CLINIC | Age: 70
End: 2025-08-20
Payer: MEDICARE

## 2025-09-17 ENCOUNTER — APPOINTMENT (OUTPATIENT)
Dept: CARDIOLOGY | Facility: CLINIC | Age: 70
End: 2025-09-17
Payer: MEDICARE

## 2025-10-23 ENCOUNTER — APPOINTMENT (OUTPATIENT)
Dept: PRIMARY CARE | Facility: CLINIC | Age: 70
End: 2025-10-23
Payer: MEDICARE

## 2026-06-17 ENCOUNTER — APPOINTMENT (OUTPATIENT)
Dept: DERMATOLOGY | Facility: CLINIC | Age: 71
End: 2026-06-17
Payer: MEDICARE